# Patient Record
Sex: FEMALE | Race: WHITE | Employment: OTHER | URBAN - METROPOLITAN AREA
[De-identification: names, ages, dates, MRNs, and addresses within clinical notes are randomized per-mention and may not be internally consistent; named-entity substitution may affect disease eponyms.]

---

## 2017-01-17 RX ORDER — DONEPEZIL HYDROCHLORIDE 5 MG/1
TABLET, FILM COATED ORAL
Qty: 90 TAB | Refills: 0 | Status: SHIPPED | OUTPATIENT
Start: 2017-01-17 | End: 2018-01-03 | Stop reason: SDUPTHER

## 2017-04-06 DIAGNOSIS — E11.9 CONTROLLED TYPE 2 DIABETES MELLITUS WITHOUT COMPLICATION, WITHOUT LONG-TERM CURRENT USE OF INSULIN (HCC): Chronic | ICD-10-CM

## 2017-04-06 RX ORDER — SITAGLIPTIN 100 MG/1
TABLET, FILM COATED ORAL
Qty: 45 TAB | Refills: 0 | Status: SHIPPED | OUTPATIENT
Start: 2017-04-06 | End: 2017-06-26 | Stop reason: SDUPTHER

## 2017-04-19 RX ORDER — DONEPEZIL HYDROCHLORIDE 5 MG/1
TABLET, FILM COATED ORAL
Qty: 90 TAB | Refills: 0 | Status: SHIPPED | OUTPATIENT
Start: 2017-04-19 | End: 2018-01-01 | Stop reason: SDDI

## 2017-06-26 DIAGNOSIS — E11.9 CONTROLLED TYPE 2 DIABETES MELLITUS WITHOUT COMPLICATION, WITHOUT LONG-TERM CURRENT USE OF INSULIN (HCC): Chronic | ICD-10-CM

## 2017-06-26 RX ORDER — SITAGLIPTIN 100 MG/1
TABLET, FILM COATED ORAL
Qty: 45 TAB | Refills: 0 | Status: SHIPPED | OUTPATIENT
Start: 2017-06-26 | End: 2018-01-09 | Stop reason: SDUPTHER

## 2017-12-27 ENCOUNTER — OFFICE VISIT (OUTPATIENT)
Dept: INTERNAL MEDICINE CLINIC | Age: 82
End: 2017-12-27

## 2017-12-27 ENCOUNTER — HOSPITAL ENCOUNTER (OUTPATIENT)
Dept: LAB | Age: 82
Discharge: HOME | End: 2017-12-27
Payer: MEDICARE

## 2017-12-27 VITALS
OXYGEN SATURATION: 97 % | HEART RATE: 64 BPM | SYSTOLIC BLOOD PRESSURE: 113 MMHG | RESPIRATION RATE: 16 BRPM | TEMPERATURE: 98.6 F | DIASTOLIC BLOOD PRESSURE: 56 MMHG

## 2017-12-27 DIAGNOSIS — I47.1 ATRIAL TACHYCARDIA (HCC): ICD-10-CM

## 2017-12-27 DIAGNOSIS — M21.372 FOOT DROP, BILATERAL: Primary | ICD-10-CM

## 2017-12-27 DIAGNOSIS — E11.9 CONTROLLED TYPE 2 DIABETES MELLITUS WITHOUT COMPLICATION, WITHOUT LONG-TERM CURRENT USE OF INSULIN (HCC): Chronic | ICD-10-CM

## 2017-12-27 DIAGNOSIS — E78.2 MIXED HYPERLIPIDEMIA: ICD-10-CM

## 2017-12-27 DIAGNOSIS — I10 ESSENTIAL HYPERTENSION: ICD-10-CM

## 2017-12-27 DIAGNOSIS — M21.371 FOOT DROP, BILATERAL: Primary | ICD-10-CM

## 2017-12-27 PROCEDURE — 80053 COMPREHEN METABOLIC PANEL: CPT

## 2017-12-27 PROCEDURE — 84484 ASSAY OF TROPONIN QUANT: CPT

## 2017-12-27 PROCEDURE — 87086 URINE CULTURE/COLONY COUNT: CPT

## 2017-12-27 PROCEDURE — 87077 CULTURE AEROBIC IDENTIFY: CPT

## 2017-12-27 PROCEDURE — 82607 VITAMIN B-12: CPT

## 2017-12-27 PROCEDURE — 85025 COMPLETE CBC W/AUTO DIFF WBC: CPT

## 2017-12-27 PROCEDURE — 83036 HEMOGLOBIN GLYCOSYLATED A1C: CPT

## 2017-12-27 PROCEDURE — 36415 COLL VENOUS BLD VENIPUNCTURE: CPT

## 2017-12-27 PROCEDURE — 81001 URINALYSIS AUTO W/SCOPE: CPT

## 2017-12-27 PROCEDURE — 84443 ASSAY THYROID STIM HORMONE: CPT

## 2017-12-27 PROCEDURE — 87186 SC STD MICRODIL/AGAR DIL: CPT

## 2017-12-27 NOTE — PROGRESS NOTES
HPI:  Matthew Becker is a 80y.o. year old female who is here for difficulty walking for more than a week. She has had a long-standing history of left foot drop but has not been wearing her brace as she did not think it was helpful. She has been compensating for that for years. Over the last week to 2 she has had increasing difficulty with placing her right foot. She denies any falls. No injuries. She is using a Rollator to help with walking and has been for years. She has been taking her blood sugar medication off and on. She denies any pain. Denies headaches or dizziness. No nosebleeds. No chest pains or shortness of breath. No cough or wheeze. No change in bowel or bladder habits. Denies any pain in her legs or lower back. Past Medical History:   Diagnosis Date    Aortic stenosis     Atrial tachycardia (HCC)     Diabetes ([facility]     Ejection fraction < 50% 5/9/2016    Foot drop, left     Glaucoma     Hypercholesterolemia     Hypertension        Past Surgical History:   Procedure Laterality Date    HX APPENDECTOMY  1943    HX BREAST BIOPSY      lumpectomy of left breast-benign 1980's    HX CATARACT REMOVAL      Bilateral    HX GYN      ruptured ovary 1943    HX ORTHOPAEDIC  '96    KNEE; broke left knee    HX ORTHOPAEDIC      BACK; disc removal       Prior to Admission medications    Medication Sig Start Date End Date Taking? Authorizing Provider   JANUVIA 100 mg tablet TAKE 1/2 TABLET BY MOUTH DAILY 6/26/17   Lakeshia Vidal III, MD   donepezil (ARICEPT) 5 mg tablet TAKE ONE TABLET BY MOUTH NIGHTLY 4/19/17   Lakeshia Vidal III, MD   donepezil (ARICEPT) 5 mg tablet TAKE ONE TABLET BY MOUTH NIGHTLY 1/17/17   Lakeshia Vidal III, MD   LOPERAMIDE HCL (IMODIUM PO) Take  by mouth. Historical Provider   donepezil (ARICEPT) 5 mg tablet Take 1 Tab by mouth nightly.  11/10/16   Lakeshia Vidal III, MD   gentamicin (GARAMYCIN) 0.3 % ophthalmic solution Administer 2 Drops to left eye four (4) times daily. 11/10/16   Frank Templeton III, MD   ALPRAZolam Geoffry Chill) 0.25 mg tablet TAKE ONE-HALF TABLET BY MOUTH 2 TIMES A DAY AS NEEDED 8/17/16   Frank Templeton III, MD   propranolol (INDERAL) 40 mg tablet TAKE ONE TABLET BY MOUTH 2 TIMES A DAY 6/10/16   Shary Soulier, NP   PARoxetine (PAXIL) 10 mg tablet TAKE ONE TABLET BY MOUTH EVERY DAY 3/10/16   Peter Pierre MD   Vit A,C,E-Zinc-Copper (PRESERVISION) cap capsule Take 1 Cap by mouth. Historical Provider   acetaminophen (TYLENOL EXTRA STRENGTH) 500 mg tablet Take 2 Tabs by mouth every six (6) hours as needed for Pain. 1/21/16   Alla Saxena MD   Wheat Dextrin (BENEFIBER) 1 gram chew Take 1 Tab by mouth daily. 11/5/15   Frank Templeton III, MD   LACTOBACILLUS ACIDOPHILUS (PROBIOTIC PO) Take  by mouth daily. Historical Provider   aspirin delayed-release 81 mg tablet Take 81 mg by mouth daily. Historical Provider   brinzolamide (AZOPT) 1 % ophthalmic suspension Administer 1 Drop to left eye two (2) times a day. Historical Provider   brimonidine (ALPHAGAN P) 0.1 % ophthalmic solution Administer 1 Drop to left eye three (3) times daily. Historical Provider   latanoprost (XALATAN) 0.005 % ophthalmic solution Administer 1 Drop to left eye nightly. Historical Provider   cholecalciferol (VITAMIN D3) 1,000 unit tablet Take 1,000 Units by mouth daily. Historical Provider       Social History     Social History    Marital status:      Spouse name: N/A    Number of children: N/A    Years of education: N/A     Occupational History    Not on file.      Social History Main Topics    Smoking status: Never Smoker    Smokeless tobacco: Never Used    Alcohol use No    Drug use: No    Sexual activity: Not Currently     Other Topics Concern    Not on file     Social History Narrative          ROS  Per HPI    Visit Vitals    /56    Pulse 64    Temp 98.6 °F (37 °C) (Oral)    Resp 16    SpO2 97%         Physical Exam Physical Examination: General appearance - alert, well appearing, and in no distress  Mouth - mucous membranes moist, pharynx normal without lesions  Neck - supple, no significant adenopathy  Lymphatics - no palpable lymphadenopathy, no hepatosplenomegaly  Chest - clear to auscultation, no wheezes, rales or rhonchi, symmetric air entry  Heart - normal rate and regular rhythm  Abdomen - soft, nontender, nondistended, no masses or organomegaly  Musculoskeletal - no joint tenderness, deformity or swelling  Extremities - peripheral pulses normal, no pedal edema, no clubbing or cyanosis  She has an obvious footdrop on the left with a brace in place. She has difficulty with plantar flexion. She has difficulty with plantar flexion on the right foot as well. Otherwise she has excellent strength in her lower extremities and upper extremities. Reflexes are reduced throughout. Assessment/Plan:  Diagnoses and all orders for this visit:    1. Foot drop, bilateral -likely diabetic neuropathy related. Will obtain a nerve conduction velocity to see if this is something that can be addressed or fixed. In the meantime we will order home physical therapy for her. This visit will service her face-to-face for home physical therapy. She is is currently homebound and lives in an assisted living and has done so for quite some time now. She requires assistance for her activities of daily living due to generalized weakness as well as profound vision and hearing deficits. -     TSH RFX ON ABNORMAL TO FREE T4  -     VITAMIN B12 & FOLATE  -     NCV SENSORY OR MIXED; Future    2. Controlled type 2 diabetes mellitus without complication, without long-term current use of insulin ([address] -? Controlled. She is noncompliant with her medication and her caregivers are checking her sugars intermittently. Will check labs today and address as needed.   -     METABOLIC PANEL, COMPREHENSIVE  -     HEMOGLOBIN A1C WITH EAG  -     CBC WITH AUTOMATED DIFF  -     UA/M W/RFLX CULTURE, ROUTINE    3. Atrial tachycardia ([address]    4. Essential hypertension -blood pressure well controlled. Will continue current medicines for now. 5. Mixed hyperlipidemia       Follow-up Disposition:        Advised her to call back or return to office if symptoms worsen/change/persist.  Discussed expected course/resolution/complications of diagnosis in detail with patient. Medication risks/benefits/costs/interactions/alternatives discussed with patient. She was given an after visit summary which includes diagnoses, current medications, & vitals. She expressed understanding with the diagnosis and plan.

## 2017-12-27 NOTE — PROGRESS NOTES
Chief Complaint   Patient presents with    Difficulty Walking     1. Have you been to the ER, urgent care clinic since your last visit? Hospitalized since your last visit? No    2. Have you seen or consulted any other health care providers outside of the [address] [place] [address] since your last visit? Include any pap smears or colon screening.  No

## 2017-12-27 NOTE — MR AVS SNAPSHOT
Visit Information Date & Time Provider Department Dept. Phone Encounter #  
 12/27/2017 11:00 AM Haris Anthony MD [address] [id number] Internal Medicine [phone] [id number] Upcoming Health Maintenance Date Due  
 EYE EXAM RETINAL OR DILATED Q1 2/23/2016 FOOT EXAM Q1 11/5/2016 GLAUCOMA SCREENING Q2Y 2/23/2017 HEMOGLOBIN A1C Q6M 5/28/2017 Influenza Age 5 to Adult 8/1/2017 MEDICARE YEARLY EXAM 11/11/2017 MICROALBUMIN Q1 11/28/2017 LIPID PANEL Q1 11/28/2017 DTaP/Tdap/Td series (2 - Td) 11/10/2026 Allergies as of 12/27/2017  Review Complete On: 5/9/2016 By: Baron Macdonald LPN Severity Noted Reaction Type Reactions Timolol Medium 11/10/2016    Hives Current Immunizations  Reviewed on 4/6/2012 Name Date Pneumococcal Conjugate (PCV-13) 11/11/2015 ZZZ-RETIRED (DO NOT USE) Pneumococcal Vaccine (Unspecified Type) 1/1/2005 Not reviewed this visit You Were Diagnosed With   
  
 Codes Comments Foot drop, bilateral    -  Primary ICD-10-CM: M21.371, M21.372 ICD-9-CM: 736.79 Controlled type 2 diabetes mellitus without complication, without long-term current use of insulin ([facility]     ICD-10-CM: E11.9 ICD-9-CM: 250.00 Atrial tachycardia (HCC)     ICD-10-CM: I47.1 ICD-9-CM: 427.89 Essential hypertension     ICD-10-CM: I10 
ICD-9-CM: 401.9 Mixed hyperlipidemia     ICD-10-CM: E78.2 ICD-9-CM: 272.2 Vitals BP Pulse Temp Resp SpO2 OB Status 113/56 64 98.6 °F (37 °C) (Oral) 16 97% Postmenopausal  
 Smoking Status Never Smoker Preferred Pharmacy Pharmacy Name Phone [facility] [phone] Your Updated Medication List  
  
   
This list is accurate as of: 12/27/17 11:55 AM.  Always use your most recent med list.  
  
  
  
  
 acetaminophen 500 mg tablet Commonly known as:  [address]  
 Take 2 Tabs by mouth every six (6) hours as needed for Pain. ALPHAGAN P 0.1 % ophthalmic solution Generic drug:  brimonidine Administer 1 Drop to left eye three (3) times daily. ALPRAZolam 0.25 mg tablet Commonly known as:  XANAX  
TAKE ONE-HALF TABLET BY MOUTH 2 TIMES A DAY AS NEEDED  
  
 aspirin delayed-release 81 mg tablet Take 81 mg by mouth daily. AZOPT 1 % ophthalmic suspension Generic drug:  brinzolamide Administer 1 Drop to left eye two (2) times a day. Benefiber 1 gram Chew Generic drug:  Wheat Dextrin Take 1 Tab by mouth daily. * donepezil 5 mg tablet Commonly known as:  ARICEPT Take 1 Tab by mouth nightly. * donepezil 5 mg tablet Commonly known as:  ARICEPT  
TAKE ONE TABLET BY MOUTH NIGHTLY * donepezil 5 mg tablet Commonly known as:  ARICEPT  
TAKE ONE TABLET BY MOUTH NIGHTLY  
  
 gentamicin 0.3 % ophthalmic solution Commonly known as:  GARAMYCIN Administer 2 Drops to left eye four (4) times daily. IMODIUM PO Take  by mouth. JANUVIA 100 mg tablet Generic drug:  SITagliptin TAKE 1/2 TABLET BY MOUTH DAILY PARoxetine 10 mg tablet Commonly known as:  PAXIL TAKE ONE TABLET BY MOUTH EVERY DAY  
  
 PROBIOTIC PO Take  by mouth daily. propranolol 40 mg tablet Commonly known as:  INDERAL  
TAKE ONE TABLET BY MOUTH 2 TIMES A DAY Vit A,C,E-Zinc-Copper Cap capsule Commonly known as:  PRESERVISION Take 1 Cap by mouth. VITAMIN D3 1,000 unit tablet Generic drug:  cholecalciferol Take 1,000 Units by mouth daily. XALATAN 0.005 % ophthalmic solution Generic drug:  latanoprost  
Administer 1 Drop to left eye nightly. * Notice: This list has 3 medication(s) that are the same as other medications prescribed for you. Read the directions carefully, and ask your doctor or other care provider to review them with you. We Performed the Following CBC WITH AUTOMATED DIFF [81486 CPT(R)] HEMOGLOBIN A1C WITH EAG [17176 CPT(R)] METABOLIC PANEL, COMPREHENSIVE [24164 CPT(R)] TSH RFX ON ABNORMAL TO FREE T4 [[id number] Custom] UA/M W/RFLX CULTURE, ROUTINE [[id number] Custom] VITAMIN B12 & FOLATE [19403 CPT(R)] To-Do List   
 12/27/2017 Neurology:  NCV SENSORY OR MIXED Introducing [facility]! Dear Dave Sanchez: Thank you for requesting a [employer] account. Our records indicate that you already have an active [employer] account. You can access your account anytime at [email or web address] Did you know that you can access your hospital and ER discharge instructions at any time in [employer]? You can also review all of your test results from your hospital stay or ER visit. Additional Information If you have questions, please visit the Frequently Asked Questions section of the [employer] website at [email or web address] Remember, [employer] is NOT to be used for urgent needs. For medical emergencies, dial 911. Now available from your iPhone and Android! Please provide this summary of care documentation to your next provider. Your primary care clinician is listed as Geoff [phone] If you have any questions after today's visit, please call [phone].

## 2017-12-30 LAB
ALBUMIN SERPL-MCNC: 3.4 G/DL (ref 3.2–4.6)
ALBUMIN/GLOB SERPL: 1.1 {RATIO} (ref 1.2–2.2)
ALP SERPL-CCNC: 89 IU/L (ref 39–117)
ALT SERPL-CCNC: 10 IU/L (ref 0–32)
APPEARANCE UR: ABNORMAL
AST SERPL-CCNC: 20 IU/L (ref 0–40)
BACTERIA #/AREA URNS HPF: ABNORMAL /[HPF]
BACTERIA UR CULT: ABNORMAL
BASOPHILS # BLD AUTO: 0.1 X10E3/UL (ref 0–0.2)
BASOPHILS NFR BLD AUTO: 1 %
BILIRUB SERPL-MCNC: 0.5 MG/DL (ref 0–1.2)
BILIRUB UR QL STRIP: NEGATIVE
BUN SERPL-MCNC: 18 MG/DL (ref 10–36)
BUN/CREAT SERPL: 21 (ref 12–28)
CALCIUM SERPL-MCNC: 10.2 MG/DL (ref 8.7–10.3)
CASTS URNS QL MICRO: ABNORMAL /LPF
CHLORIDE SERPL-SCNC: 105 MMOL/L (ref 96–106)
CO2 SERPL-SCNC: 24 MMOL/L (ref 18–29)
COLOR UR: YELLOW
CREAT SERPL-MCNC: 0.85 MG/DL (ref 0.57–1)
EOSINOPHIL # BLD AUTO: 0.2 X10E3/UL (ref 0–0.4)
EOSINOPHIL NFR BLD AUTO: 2 %
EPI CELLS #/AREA URNS HPF: >10 /HPF
ERYTHROCYTE [DISTWIDTH] IN BLOOD BY AUTOMATED COUNT: 12.9 % (ref 12.3–15.4)
EST. AVERAGE GLUCOSE BLD GHB EST-MCNC: 160 MG/DL
FOLATE SERPL-MCNC: >20 NG/ML
GLOBULIN SER CALC-MCNC: 3.1 G/DL (ref 1.5–4.5)
GLUCOSE SERPL-MCNC: 116 MG/DL (ref 65–99)
GLUCOSE UR QL: NEGATIVE
HBA1C MFR BLD: 7.2 % (ref 4.8–5.6)
HCT VFR BLD AUTO: 35.9 % (ref 34–46.6)
HGB BLD-MCNC: 11.8 G/DL (ref 11.1–15.9)
HGB UR QL STRIP: ABNORMAL
IMM GRANULOCYTES # BLD: 0 X10E3/UL (ref 0–0.1)
IMM GRANULOCYTES NFR BLD: 0 %
KETONES UR QL STRIP: NEGATIVE
LEUKOCYTE ESTERASE UR QL STRIP: ABNORMAL
LYMPHOCYTES # BLD AUTO: 3.8 X10E3/UL (ref 0.7–3.1)
LYMPHOCYTES NFR BLD AUTO: 38 %
MCH RBC QN AUTO: 28.9 PG (ref 26.6–33)
MCHC RBC AUTO-ENTMCNC: 32.9 G/DL (ref 31.5–35.7)
MCV RBC AUTO: 88 FL (ref 79–97)
MICRO URNS: ABNORMAL
MONOCYTES # BLD AUTO: 0.7 X10E3/UL (ref 0.1–0.9)
MONOCYTES NFR BLD AUTO: 7 %
MUCOUS THREADS URNS QL MICRO: PRESENT
NEUTROPHILS # BLD AUTO: 5.2 X10E3/UL (ref 1.4–7)
NEUTROPHILS NFR BLD AUTO: 52 %
NITRITE UR QL STRIP: POSITIVE
PH UR STRIP: 5 [PH] (ref 5–7.5)
PLATELET # BLD AUTO: 211 X10E3/UL (ref 150–379)
POTASSIUM SERPL-SCNC: 4.5 MMOL/L (ref 3.5–5.2)
PROT SERPL-MCNC: 6.5 G/DL (ref 6–8.5)
PROT UR QL STRIP: ABNORMAL
RBC # BLD AUTO: 4.09 X10E6/UL (ref 3.77–5.28)
RBC #/AREA URNS HPF: ABNORMAL /HPF
SODIUM SERPL-SCNC: 141 MMOL/L (ref 134–144)
SP GR UR: 1.02 (ref 1–1.03)
TSH SERPL DL<=0.005 MIU/L-ACNC: 2.45 UIU/ML (ref 0.45–4.5)
URINALYSIS REFLEX, 377202: ABNORMAL
UROBILINOGEN UR STRIP-MCNC: 0.2 MG/DL (ref 0.2–1)
VIT B12 SERPL-MCNC: 537 PG/ML (ref 232–1245)
WBC # BLD AUTO: 9.9 X10E3/UL (ref 3.4–10.8)
WBC #/AREA URNS HPF: >30 /HPF

## 2018-01-01 ENCOUNTER — TELEPHONE (OUTPATIENT)
Dept: INTERNAL MEDICINE CLINIC | Age: 83
End: 2018-01-01

## 2018-01-01 ENCOUNTER — CLINICAL SUPPORT (OUTPATIENT)
Dept: INTERNAL MEDICINE CLINIC | Age: 83
End: 2018-01-01

## 2018-01-01 ENCOUNTER — PATIENT MESSAGE (OUTPATIENT)
Dept: INTERNAL MEDICINE CLINIC | Age: 83
End: 2018-01-01

## 2018-01-01 ENCOUNTER — HOSPITAL ENCOUNTER (OUTPATIENT)
Dept: LAB | Age: 83
Discharge: HOME | End: 2018-06-25
Payer: MEDICARE

## 2018-01-01 ENCOUNTER — OFFICE VISIT (OUTPATIENT)
Dept: INTERNAL MEDICINE CLINIC | Age: 83
End: 2018-01-01

## 2018-01-01 ENCOUNTER — HOSPITAL ENCOUNTER (OUTPATIENT)
Dept: LAB | Age: 83
Discharge: HOME | End: 2018-06-15
Payer: MEDICARE

## 2018-01-01 ENCOUNTER — HOSPITAL ENCOUNTER (EMERGENCY)
Age: 83
Discharge: HOME HEALTH | End: 2018-07-28
Attending: EMERGENCY MEDICINE
Payer: MEDICARE

## 2018-01-01 ENCOUNTER — PATIENT OUTREACH (OUTPATIENT)
Dept: INTERNAL MEDICINE CLINIC | Age: 83
End: 2018-01-01

## 2018-01-01 ENCOUNTER — HOSPITAL ENCOUNTER (OUTPATIENT)
Dept: LAB | Age: 83
Discharge: HOME | End: 2018-11-12
Payer: MEDICARE

## 2018-01-01 ENCOUNTER — APPOINTMENT (OUTPATIENT)
Dept: CT IMAGING | Age: 83
End: 2018-01-01
Attending: EMERGENCY MEDICINE
Payer: MEDICARE

## 2018-01-01 ENCOUNTER — HOSPICE ADMISSION (OUTPATIENT)
Dept: HOSPICE | Facility: HOSPICE | Age: 83
End: 2018-01-01

## 2018-01-01 ENCOUNTER — HOSPITAL ENCOUNTER (OUTPATIENT)
Dept: LAB | Age: 83
Discharge: HOME | End: 2018-06-26
Payer: MEDICARE

## 2018-01-01 ENCOUNTER — APPOINTMENT (OUTPATIENT)
Dept: GENERAL RADIOLOGY | Age: 83
End: 2018-01-01
Attending: EMERGENCY MEDICINE
Payer: MEDICARE

## 2018-01-01 VITALS
SYSTOLIC BLOOD PRESSURE: 112 MMHG | HEART RATE: 75 BPM | BODY MASS INDEX: 22.82 KG/M2 | WEIGHT: 142 LBS | TEMPERATURE: 97.5 F | OXYGEN SATURATION: 100 % | RESPIRATION RATE: 16 BRPM | HEIGHT: 66 IN | DIASTOLIC BLOOD PRESSURE: 82 MMHG

## 2018-01-01 VITALS
OXYGEN SATURATION: 98 % | DIASTOLIC BLOOD PRESSURE: 71 MMHG | HEART RATE: 58 BPM | TEMPERATURE: 98.7 F | SYSTOLIC BLOOD PRESSURE: 125 MMHG

## 2018-01-01 VITALS
SYSTOLIC BLOOD PRESSURE: 114 MMHG | TEMPERATURE: 97.9 F | RESPIRATION RATE: 10 BRPM | HEART RATE: 58 BPM | HEIGHT: 66 IN | DIASTOLIC BLOOD PRESSURE: 74 MMHG | OXYGEN SATURATION: 98 %

## 2018-01-01 DIAGNOSIS — R41.82 ALTERED MENTAL STATUS, UNSPECIFIED ALTERED MENTAL STATUS TYPE: ICD-10-CM

## 2018-01-01 DIAGNOSIS — E11.9 CONTROLLED TYPE 2 DIABETES MELLITUS WITHOUT COMPLICATION, WITHOUT LONG-TERM CURRENT USE OF INSULIN (HCC): Chronic | ICD-10-CM

## 2018-01-01 DIAGNOSIS — N39.0 FREQUENT UTI: ICD-10-CM

## 2018-01-01 DIAGNOSIS — R41.0 DELIRIUM: ICD-10-CM

## 2018-01-01 DIAGNOSIS — R39.9 UTI SYMPTOMS: Primary | ICD-10-CM
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
## 2018-01-01 DIAGNOSIS — Z87.440 HX: UTI (URINARY TRACT INFECTION): Primary | ICD-10-CM

## 2018-01-01 DIAGNOSIS — N39.0 FREQUENT UTI: Primary | ICD-10-CM

## 2018-01-01 DIAGNOSIS — S09.90XA MINOR HEAD INJURY, INITIAL ENCOUNTER: ICD-10-CM

## 2018-01-01 DIAGNOSIS — S60.212A CONTUSION OF LEFT WRIST, INITIAL ENCOUNTER: ICD-10-CM

## 2018-01-01 DIAGNOSIS — I10 ESSENTIAL HYPERTENSION: ICD-10-CM

## 2018-01-01 DIAGNOSIS — S61.512A TEAR OF SKIN OF LEFT WRIST, INITIAL ENCOUNTER: ICD-10-CM

## 2018-01-01 DIAGNOSIS — N17.9 AKI (ACUTE KIDNEY INJURY) (HCC): ICD-10-CM

## 2018-01-01 DIAGNOSIS — E11.9 CONTROLLED TYPE 2 DIABETES MELLITUS WITHOUT COMPLICATION, WITHOUT LONG-TERM CURRENT USE OF INSULIN (HCC): Primary | Chronic | ICD-10-CM

## 2018-01-01 LAB
ALBUMIN SERPL-MCNC: 3.7 G/DL (ref 3.2–4.6)
ALBUMIN/GLOB SERPL: 1.3 {RATIO} (ref 1.2–2.2)
ALP SERPL-CCNC: 90 IU/L (ref 39–117)
ALT SERPL-CCNC: 9 IU/L (ref 0–32)
APPEARANCE UR: ABNORMAL
APPEARANCE UR: CLEAR
APPEARANCE UR: CLEAR
AST SERPL-CCNC: 20 IU/L (ref 0–40)
BACTERIA #/AREA URNS HPF: ABNORMAL /[HPF]
BACTERIA #/AREA URNS HPF: ABNORMAL /[HPF]
BACTERIA #/AREA URNS HPF: NORMAL /[HPF]
BACTERIA UR CULT: ABNORMAL
BACTERIA UR CULT: ABNORMAL
BACTERIA UR CULT: NORMAL
BASOPHILS # BLD AUTO: 0.1 X10E3/UL (ref 0–0.2)
BASOPHILS NFR BLD AUTO: 1 %
BILIRUB SERPL-MCNC: 0.6 MG/DL (ref 0–1.2)
BILIRUB UR QL STRIP: NEGATIVE
BUN SERPL-MCNC: 23 MG/DL (ref 10–36)
BUN/CREAT SERPL: 24 (ref 12–28)
CALCIUM SERPL-MCNC: 10.6 MG/DL (ref 8.7–10.3)
CASTS URNS QL MICRO: ABNORMAL /LPF
CASTS URNS QL MICRO: ABNORMAL /LPF
CASTS URNS QL MICRO: NORMAL /LPF
CHLORIDE SERPL-SCNC: 101 MMOL/L (ref 96–106)
CO2 SERPL-SCNC: 22 MMOL/L (ref 20–29)
COLOR UR: YELLOW
CREAT SERPL-MCNC: 0.97 MG/DL (ref 0.57–1)
EOSINOPHIL # BLD AUTO: 0.2 X10E3/UL (ref 0–0.4)
EOSINOPHIL NFR BLD AUTO: 2 %
EPI CELLS #/AREA URNS HPF: ABNORMAL /HPF
EPI CELLS #/AREA URNS HPF: ABNORMAL /HPF
EPI CELLS #/AREA URNS HPF: NORMAL /HPF
ERYTHROCYTE [DISTWIDTH] IN BLOOD BY AUTOMATED COUNT: 13.5 % (ref 12.3–15.4)
GLOBULIN SER CALC-MCNC: 2.8 G/DL (ref 1.5–4.5)
GLUCOSE SERPL-MCNC: 141 MG/DL (ref 65–99)
GLUCOSE UR QL: NEGATIVE
GLUCOSE UR-MCNC: NEGATIVE MG/DL
GLUCOSE UR-MCNC: NEGATIVE MG/DL
HCT VFR BLD AUTO: 37.3 % (ref 34–46.6)
HGB BLD-MCNC: 12.9 G/DL (ref 11.1–15.9)
HGB UR QL STRIP: ABNORMAL
HGB UR QL STRIP: NEGATIVE
HGB UR QL STRIP: NEGATIVE
KETONES P FAST UR STRIP-MCNC: NEGATIVE MG/DL
KETONES P FAST UR STRIP-MCNC: NEGATIVE MG/DL
KETONES UR QL STRIP: NEGATIVE
LEUKOCYTE ESTERASE UR QL STRIP: ABNORMAL
LYMPHOCYTES # BLD AUTO: 3 X10E3/UL (ref 0.7–3.1)
LYMPHOCYTES NFR BLD AUTO: 31 %
MCH RBC QN AUTO: 29.7 PG (ref 26.6–33)
MCHC RBC AUTO-ENTMCNC: 34.6 G/DL (ref 31.5–35.7)
MCV RBC AUTO: 86 FL (ref 79–97)
MICRO URNS: ABNORMAL
MONOCYTES # BLD AUTO: 0.8 X10E3/UL (ref 0.1–0.9)
MONOCYTES NFR BLD AUTO: 8 %
MUCOUS THREADS URNS QL MICRO: PRESENT
MUCOUS THREADS URNS QL MICRO: PRESENT
NEUTROPHILS # BLD AUTO: 5.7 X10E3/UL (ref 1.4–7)
NEUTROPHILS NFR BLD AUTO: 58 %
NITRITE UR QL STRIP: NEGATIVE
NITRITE UR QL STRIP: NEGATIVE
NITRITE UR QL STRIP: POSITIVE
PH UR STRIP: 5.5 [PH] (ref 4.6–8)
PH UR STRIP: 5.5 [PH] (ref 5–7.5)
PH UR STRIP: 6 [PH] (ref 5–7.5)
PH UR STRIP: 7 [PH] (ref 4.6–8)
PH UR STRIP: 7 [PH] (ref 5–7.5)
PLATELET # BLD AUTO: 223 X10E3/UL (ref 150–379)
POTASSIUM SERPL-SCNC: 4.3 MMOL/L (ref 3.5–5.2)
PROT SERPL-MCNC: 6.5 G/DL (ref 6–8.5)
PROT UR QL STRIP: ABNORMAL
PROT UR QL STRIP: NEGATIVE
RBC # BLD AUTO: 4.35 X10E6/UL (ref 3.77–5.28)
RBC #/AREA URNS HPF: ABNORMAL /HPF
RBC #/AREA URNS HPF: ABNORMAL /HPF
RBC #/AREA URNS HPF: NORMAL /HPF
SODIUM SERPL-SCNC: 138 MMOL/L (ref 134–144)
SP GR UR STRIP: 1.01 (ref 1–1.03)
SP GR UR STRIP: NORMAL (ref 1–1.03)
SP GR UR: 1.01 (ref 1–1.03)
UA UROBILINOGEN AMB POC: NORMAL (ref 0.2–1)
UA UROBILINOGEN AMB POC: NORMAL (ref 0.2–1)
URINALYSIS CLARITY POC: CLEAR
URINALYSIS CLARITY POC: CLEAR
URINALYSIS COLOR POC: YELLOW
URINALYSIS COLOR POC: YELLOW
URINALYSIS REFLEX, 377202: ABNORMAL
URINE BLOOD POC: NEGATIVE
URINE BLOOD POC: NORMAL
URINE LEUKOCYTES POC: NEGATIVE
URINE LEUKOCYTES POC: NORMAL
URINE NITRITES POC: NEGATIVE
URINE NITRITES POC: NEGATIVE
UROBILINOGEN UR STRIP-MCNC: 0.2 MG/DL (ref 0.2–1)
WBC # BLD AUTO: 9.7 X10E3/UL (ref 3.4–10.8)
WBC #/AREA URNS HPF: >30 /HPF
WBC #/AREA URNS HPF: ABNORMAL /HPF
WBC #/AREA URNS HPF: NORMAL /HPF

## 2018-01-01 PROCEDURE — 87086 URINE CULTURE/COLONY COUNT: CPT

## 2018-01-01 PROCEDURE — 81001 URINALYSIS AUTO W/SCOPE: CPT

## 2018-01-01 PROCEDURE — 85025 COMPLETE CBC W/AUTO DIFF WBC: CPT

## 2018-01-01 PROCEDURE — 36415 COLL VENOUS BLD VENIPUNCTURE: CPT

## 2018-01-01 PROCEDURE — 87186 SC STD MICRODIL/AGAR DIL: CPT

## 2018-01-01 PROCEDURE — 99285 EMERGENCY DEPT VISIT HI MDM: CPT

## 2018-01-01 PROCEDURE — 73610 X-RAY EXAM OF ANKLE: CPT

## 2018-01-01 PROCEDURE — 87088 URINE BACTERIA CULTURE: CPT

## 2018-01-01 PROCEDURE — 87077 CULTURE AEROBIC IDENTIFY: CPT

## 2018-01-01 PROCEDURE — 80053 COMPREHEN METABOLIC PANEL: CPT

## 2018-01-01 PROCEDURE — 70450 CT HEAD/BRAIN W/O DYE: CPT

## 2018-01-01 PROCEDURE — 73110 X-RAY EXAM OF WRIST: CPT

## 2018-01-01 RX ORDER — SULFAMETHOXAZOLE AND TRIMETHOPRIM 800; 160 MG/1; MG/1
1 TABLET ORAL 2 TIMES DAILY
Qty: 14 TAB | Refills: 0 | Status: SHIPPED | OUTPATIENT
Start: 2018-01-01 | End: 2018-01-01

## 2018-01-01 RX ORDER — NITROFURANTOIN 25; 75 MG/1; MG/1
100 CAPSULE ORAL 2 TIMES DAILY
Qty: 14 CAP | Refills: 0 | Status: SHIPPED | OUTPATIENT
Start: 2018-01-01 | End: 2018-01-01 | Stop reason: SDDI

## 2018-01-01 RX ORDER — SULFAMETHOXAZOLE AND TRIMETHOPRIM 800; 160 MG/1; MG/1
1 TABLET ORAL 2 TIMES DAILY
COMMUNITY
End: 2018-01-01 | Stop reason: SDDI

## 2018-01-01 RX ORDER — SULFAMETHOXAZOLE AND TRIMETHOPRIM 800; 160 MG/1; MG/1
1 TABLET ORAL 2 TIMES DAILY
Qty: 14 TAB | Refills: 0 | Status: SHIPPED | OUTPATIENT
Start: 2018-01-01 | End: 2018-01-01 | Stop reason: SDUPTHER

## 2018-01-01 RX ORDER — CIPROFLOXACIN 250 MG/1
250 TABLET, FILM COATED ORAL 2 TIMES DAILY
Qty: 14 TAB | Refills: 0 | Status: SHIPPED | OUTPATIENT
Start: 2018-01-01 | End: 2018-01-01 | Stop reason: SDUPTHER

## 2018-01-02 ENCOUNTER — TELEPHONE (OUTPATIENT)
Dept: INTERNAL MEDICINE CLINIC | Age: 83
End: 2018-01-02

## 2018-01-03 ENCOUNTER — TELEPHONE (OUTPATIENT)
Dept: INTERNAL MEDICINE CLINIC | Age: 83
End: 2018-01-03

## 2018-01-03 DIAGNOSIS — M21.372 FOOT DROP, BILATERAL: Primary | ICD-10-CM

## 2018-01-03 DIAGNOSIS — M21.371 FOOT DROP, BILATERAL: Primary | ICD-10-CM

## 2018-01-03 RX ORDER — SULFAMETHOXAZOLE AND TRIMETHOPRIM 800; 160 MG/1; MG/1
1 TABLET ORAL 2 TIMES DAILY
Qty: 14 TAB | Refills: 0 | Status: SHIPPED | OUTPATIENT
Start: 2018-01-03 | End: 2018-01-10

## 2018-01-03 NOTE — TELEPHONE ENCOUNTER
Spoke with pt daughter [facility] in ref recent lab results. is on HIPPA, 2 pt identifiers. Pt ucx grew >100,000 colony klebsiella pneumoniae. Per SRJ, will start Bactrim DS BID x 7 days. All other labs fine. Force fluids. Rx sent to pharm. Orders Placed This Encounter    trimethoprim-sulfamethoxazole (BACTRIM DS, SEPTRA DS) 160-800 mg per tablet     Sig: Take 1 Tab by mouth two (2) times a day for 7 days.      Dispense:  14 Tab     Refill:  0

## 2018-01-03 NOTE — TELEPHONE ENCOUNTER
Spoke with daughter Sujatha Selby again who is on hippa. 2 pt identifiers. She wanted to know the results of the A1c.

## 2018-01-03 NOTE — TELEPHONE ENCOUNTER
----- Message from Darold Gaucher III, MD sent at 1/1/2018  3:30 PM EST -----  Bactrim DS BID for 7 days. Other labs are OK.

## 2018-01-03 NOTE — PROGRESS NOTES
Spoke with pt daughter Kilo Penn who is on Hippa. 2 pt identifiers. Decided that pt will not need home PT. She will take her to the PT team where pt lives at [facility]. Order mailed to pt home.

## 2018-01-03 NOTE — TELEPHONE ENCOUNTER
Pt's daughter was told about the abnormal lab but, did not get the information about the rest the labs.   Please advise [phone]

## 2018-01-09 DIAGNOSIS — E11.9 CONTROLLED TYPE 2 DIABETES MELLITUS WITHOUT COMPLICATION, WITHOUT LONG-TERM CURRENT USE OF INSULIN (HCC): Chronic | ICD-10-CM

## 2018-01-09 RX ORDER — SITAGLIPTIN 100 MG/1
TABLET, FILM COATED ORAL
Qty: 45 TAB | Refills: 0 | Status: SHIPPED | OUTPATIENT
Start: 2018-01-09 | End: 2018-01-01 | Stop reason: SDDI

## 2018-01-15 ENCOUNTER — TELEPHONE (OUTPATIENT)
Dept: INTERNAL MEDICINE CLINIC | Age: 83
End: 2018-01-15

## 2018-01-15 NOTE — TELEPHONE ENCOUNTER
Tata Patel () [phone]     pt's daughter is calling regarding an order she needs for her mother for a foot brace. She says that she is coming to town on wed to take her mother to [facility] that morning and would like to make only one trip.

## 2018-01-24 NOTE — TELEPHONE ENCOUNTER
Spoke with Bartolo Rodríguez. 2 pt identifiers. She states that [facility] where pt is going for [facility] is telling her that pt needs some type of foot brace? ? Explained I will contact [facility] and will call her back if I need anything from her.

## 2018-02-07 DIAGNOSIS — E11.8 TYPE 2 DIABETES MELLITUS WITH COMPLICATION, UNSPECIFIED LONG TERM INSULIN USE STATUS: ICD-10-CM

## 2018-02-07 NOTE — TELEPHONE ENCOUNTER
Orders Placed This Encounter    glucose blood VI test strips (BLOOD GLUCOSE TEST) strip     Sig: For use with Contour Martell glucometer. Pt will check BS twice a day. Dx:  E11.8. Dispense:  100 Strip     Refill:  11     The above medication refills were approved via verbal order by Dr. Elle Barfield III.

## 2018-02-19 ENCOUNTER — HOSPITAL ENCOUNTER (EMERGENCY)
Age: 83
Discharge: NURSING, REHAB OR LONG-TERM CARE | End: 2018-02-19
Attending: STUDENT IN AN ORGANIZED HEALTH CARE EDUCATION/TRAINING PROGRAM
Payer: MEDICARE

## 2018-02-19 ENCOUNTER — APPOINTMENT (OUTPATIENT)
Dept: GENERAL RADIOLOGY | Age: 83
End: 2018-02-19
Attending: STUDENT IN AN ORGANIZED HEALTH CARE EDUCATION/TRAINING PROGRAM
Payer: MEDICARE

## 2018-02-19 VITALS
HEART RATE: 64 BPM | SYSTOLIC BLOOD PRESSURE: 143 MMHG | TEMPERATURE: 98.8 F | RESPIRATION RATE: 22 BRPM | DIASTOLIC BLOOD PRESSURE: 108 MMHG | HEIGHT: 66 IN | OXYGEN SATURATION: 96 %
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
## 2018-02-19 LAB
ALBUMIN SERPL-MCNC: 3.1 G/DL (ref 3.5–5)
ALBUMIN/GLOB SERPL: 0.8 {RATIO} (ref 1.1–2.2)
ALP SERPL-CCNC: 86 U/L (ref 45–117)
ALT SERPL-CCNC: 16 U/L (ref 12–78)
ANION GAP SERPL CALC-SCNC: 6 MMOL/L (ref 5–15)
AST SERPL-CCNC: 24 U/L (ref 15–37)
ATRIAL RATE: 66 BPM
BASOPHILS # BLD: 0 K/UL (ref 0–0.1)
BASOPHILS NFR BLD: 0 % (ref 0–1)
BILIRUB SERPL-MCNC: 0.5 MG/DL (ref 0.2–1)
BUN SERPL-MCNC: 22 MG/DL (ref 6–20)
BUN/CREAT SERPL: 24 (ref 12–20)
CALCIUM SERPL-MCNC: 9.1 MG/DL (ref 8.5–10.1)
CALCULATED P AXIS, ECG09: 45 DEGREES
CALCULATED R AXIS, ECG10: -53 DEGREES
CALCULATED T AXIS, ECG11: 18 DEGREES
CHLORIDE SERPL-SCNC: 108 MMOL/L (ref 97–108)
CO2 SERPL-SCNC: 26 MMOL/L (ref 21–32)
CREAT SERPL-MCNC: 0.91 MG/DL (ref 0.55–1.02)
DIAGNOSIS, 93000: NORMAL
DIFFERENTIAL METHOD BLD: ABNORMAL
EOSINOPHIL # BLD: 0.1 K/UL (ref 0–0.4)
EOSINOPHIL NFR BLD: 2 % (ref 0–7)
ERYTHROCYTE [DISTWIDTH] IN BLOOD BY AUTOMATED COUNT: 13.2 % (ref 11.5–14.5)
GLOBULIN SER CALC-MCNC: 4.1 G/DL (ref 2–4)
GLUCOSE SERPL-MCNC: 115 MG/DL (ref 65–100)
HCT VFR BLD AUTO: 39.4 % (ref 35–47)
HGB BLD-MCNC: 12.9 G/DL (ref 11.5–16)
IMM GRANULOCYTES # BLD: 0 K/UL (ref 0–0.04)
IMM GRANULOCYTES NFR BLD AUTO: 0 % (ref 0–0.5)
LYMPHOCYTES # BLD: 3 K/UL (ref 0.8–3.5)
LYMPHOCYTES NFR BLD: 57 % (ref 12–49)
MCH RBC QN AUTO: 29.4 PG (ref 26–34)
MCHC RBC AUTO-ENTMCNC: 32.7 G/DL (ref 30–36.5)
MCV RBC AUTO: 89.7 FL (ref 80–99)
MONOCYTES # BLD: 0.5 K/UL (ref 0–1)
MONOCYTES NFR BLD: 10 % (ref 5–13)
NEUTS SEG # BLD: 1.6 K/UL (ref 1.8–8)
NEUTS SEG NFR BLD: 31 % (ref 32–75)
NRBC # BLD: 0 K/UL (ref 0–0.01)
NRBC BLD-RTO: 0 PER 100 WBC
PLATELET # BLD AUTO: 134 K/UL (ref 150–400)
PMV BLD AUTO: 11.3 FL (ref 8.9–12.9)
POTASSIUM SERPL-SCNC: 4 MMOL/L (ref 3.5–5.1)
PROT SERPL-MCNC: 7.2 G/DL (ref 6.4–8.2)
Q-T INTERVAL, ECG07: 458 MS
QRS DURATION, ECG06: 130 MS
QTC CALCULATION (BEZET), ECG08: 480 MS
RBC # BLD AUTO: 4.39 M/UL (ref 3.8–5.2)
SODIUM SERPL-SCNC: 140 MMOL/L (ref 136–145)
TROPONIN I SERPL-MCNC: <0.04 NG/ML
VENTRICULAR RATE, ECG03: 66 BPM
WBC # BLD AUTO: 5.3 K/UL (ref 3.6–11)

## 2018-02-19 PROCEDURE — 80053 COMPREHEN METABOLIC PANEL: CPT | Performed by: STUDENT IN AN ORGANIZED HEALTH CARE EDUCATION/TRAINING PROGRAM

## 2018-02-19 PROCEDURE — 93005 ELECTROCARDIOGRAM TRACING: CPT

## 2018-02-19 PROCEDURE — 84484 ASSAY OF TROPONIN QUANT: CPT | Performed by: STUDENT IN AN ORGANIZED HEALTH CARE EDUCATION/TRAINING PROGRAM

## 2018-02-19 PROCEDURE — 85025 COMPLETE CBC W/AUTO DIFF WBC: CPT | Performed by: STUDENT IN AN ORGANIZED HEALTH CARE EDUCATION/TRAINING PROGRAM

## 2018-02-19 PROCEDURE — 36415 COLL VENOUS BLD VENIPUNCTURE: CPT | Performed by: STUDENT IN AN ORGANIZED HEALTH CARE EDUCATION/TRAINING PROGRAM

## 2018-02-19 PROCEDURE — 71045 X-RAY EXAM CHEST 1 VIEW: CPT

## 2018-02-19 PROCEDURE — 99285 EMERGENCY DEPT VISIT HI MDM: CPT

## 2018-02-19 NOTE — ED NOTES
Discharge instructions discussed with pt. Pt verbalized understanding. Pt assisted into w/c and home with daughter.

## 2018-02-19 NOTE — DISCHARGE INSTRUCTIONS

## 2018-02-19 NOTE — ED PROVIDER NOTES
HPI Comments: 80 y.o. female with past medical history significant for dementia, aortic stenosis, atrial tachycardia, DM, HTN, and foot drop who presents via EMS from [facility] accompanied by her daughter with chief complaint of evaluation post-fall. Per daughter, pt sustained unwitnessed fall from recliner at [facility]. Pt has a history of dementia and is at baseline mentation. She denies pain. Per daughter, pt was brought to the ED by EMS because heart rate was in the 30s on arrival. Pt is noncompliant with prescribed medications secondary to side effects. She was previously followed by Dr. Cara Freitas, cardiology. There are no other acute medical concerns at this time. Full history, physical exam, and ROS unable to be obtained due to:  dementia. Social hx: denies tobacco use; denies EtOH use; denies illicit drug use; resides at [facility]  PCP: Thania Cruz MD  Cardiology: Cara Freitas MD    Note written by Marco Calderón, as dictated by Yvrose Lowe MD 11:35 AM        The history is provided by a relative and the patient (daughter, grandson). History limited by: age. No  was used.         Past Medical History:   Diagnosis Date    Aortic stenosis     Atrial tachycardia (HCC)     Diabetes (HCC)     Ejection fraction < 50% 5/9/2016    Foot drop, left     Glaucoma     Hypercholesterolemia     Hypertension        Past Surgical History:   Procedure Laterality Date    HX APPENDECTOMY  1943    HX BREAST BIOPSY      lumpectomy of left breast-benign 1980's    HX CATARACT REMOVAL      Bilateral    HX GYN      ruptured ovary 1943    HX ORTHOPAEDIC  '96    KNEE; broke left knee    HX ORTHOPAEDIC      BACK; disc removal         Family History:   Problem Relation Age of Onset    Cancer Father        Social History     Social History    Marital status:      Spouse name: N/A    Number of children: N/A    Years of education: N/A Occupational History    Not on file. Social History Main Topics    Smoking status: Never Smoker    Smokeless tobacco: Never Used    Alcohol use No    Drug use: No    Sexual activity: Not Currently     Other Topics Concern    Not on file     Social History Narrative         ALLERGIES: Timolol    Review of Systems   Unable to perform ROS: Dementia       Vitals:    02/19/18 1100   BP: 128/54   Pulse: 79   Resp: 17   Temp: 98.1 °F (36.7 °C)   SpO2: 95%   Height: 5' 6\" (1.676 m)            Physical Exam   Constitutional: She appears well-developed and well-nourished. No distress. Hard of hearing. HENT:   Head: Normocephalic and atraumatic. Nose: Nose normal.   Mouth/Throat: Oropharynx is clear and moist. No oropharyngeal exudate. Eyes: Conjunctivae and EOM are normal. Right eye exhibits no discharge. Left eye exhibits no discharge. No scleral icterus. Neck: Normal range of motion. Neck supple. No JVD present. No tracheal deviation present. No thyromegaly present. Cardiovascular: Normal rate, regular rhythm, normal heart sounds and intact distal pulses. Exam reveals no gallop and no friction rub. No murmur heard. Pulmonary/Chest: Effort normal and breath sounds normal. No stridor. No respiratory distress. She has no wheezes. She has no rales. She exhibits no tenderness. Abdominal: Bowel sounds are normal. She exhibits no distension and no mass. There is no tenderness. There is no rebound. Musculoskeletal: Normal range of motion. She exhibits no edema or tenderness. Lymphadenopathy:     She has no cervical adenopathy. Neurological: She is alert. No cranial nerve deficit. Coordination normal.   Pleasantly demented. Skin: Skin is warm and dry. No rash noted. She is not diaphoretic. No erythema. No pallor. Psychiatric: She has a normal mood and affect. Her behavior is normal. Judgment and thought content normal.   Nursing note and vitals reviewed.   Note written by Flaco Perez Marco, as dictated by Shahana Otoole MD 11:35 AM     MDM  Number of Diagnoses or Management Options  Fall, initial encounter:      Amount and/or Complexity of Data Reviewed  Clinical lab tests: ordered and reviewed  Tests in the radiology section of CPT®: ordered and reviewed  Obtain history from someone other than the patient: yes  Review and summarize past medical records: yes    Risk of Complications, Morbidity, and/or Mortality  Presenting problems: moderate  Diagnostic procedures: moderate  Management options: moderate    Patient Progress  Patient progress: resolved        ED Course       Procedures    ED EKG Interpretation:  Rhythm: undetermined rhythm with bifascicular block. Rate (approx.): 66; Axis: normal; Intervals: normal; ST/T wave: normal.  Note written by Marco Lambert, as dictated by Shahana Otoole MD 12:17 PM    12:46 PM  Discussed available lab and imaging results with patient, daughter, grandson. All verbalize understanding and agree with care plan. Will discharge patient home with specific return precautions; no prescriptions; f/u with PCP.    5:35 PM  The patient has been reevaluated. The patient is ready for discharge. The patient's signs, symptoms, diagnosis, and discharge instructions have been discussed and the patient/ family has conveyed their understanding. The patient is to follow up as recommended or return to the ED should their symptoms worsen. Plan has been discussed and the patient is in agreement. LABORATORY TESTS:  No results found for this or any previous visit (from the past 12 hour(s)). IMAGING RESULTS:  XR CHEST PORT   Final Result        No results found. MEDICATIONS GIVEN:  Medications - No data to display    IMPRESSION:  1. Fall, initial encounter        PLAN:  1. Discharge Medication List as of 2/19/2018 12:45 PM        2.    Follow-up Information     Follow up With Details Comments Contact Sameera Hogue MD  If symptoms worsen [address]  [address] [facility] [place] [facility]  If symptoms worsen [address] [facility]  [phone]          Return to ED for new or worsening symptoms       Jenny Ivy MD

## 2018-02-19 NOTE — ED TRIAGE NOTES
TRIAGE NOTE: Patient arrives via EMS from [facility]. Witnessed slipping out of recliner. Denies hitting head. Patient denies pain from fall. Patient with hx of dementia and is at baseline. Staff sent patient here because patient is noncompliant with medications and irregular heart rhythm.

## 2018-04-12 ENCOUNTER — TELEPHONE (OUTPATIENT)
Dept: INTERNAL MEDICINE CLINIC | Age: 83
End: 2018-04-12

## 2018-04-12 DIAGNOSIS — R35.0 URINARY FREQUENCY: Primary | ICD-10-CM

## 2018-04-12 DIAGNOSIS — Z87.440 HX: UTI (URINARY TRACT INFECTION): ICD-10-CM

## 2018-04-12 NOTE — TELEPHONE ENCOUNTER
Spoke with pt daughter Kiana Tolbert who is on Hippa. 2 pt identifiers. She states that her mom has been c/o urinary frequency, burning,and vaginal itching. Denies temp. Caregiver did an OTC dip and it was positive. She has the materials for a clean catch urine. Orders placed for ua/cx and caregiver Royer Winn will drop off sample as it is very difficult to bring pt in. I will dip the urine as well and if SRJ feels necessary will start abx before weekend.     Orders Placed This Encounter    UA/M W/RFLX CULTURE, ROUTINE

## 2018-04-13 ENCOUNTER — TELEPHONE (OUTPATIENT)
Dept: INTERNAL MEDICINE CLINIC | Age: 83
End: 2018-04-13

## 2018-04-13 DIAGNOSIS — N30.01 ACUTE CYSTITIS WITH HEMATURIA: Primary | ICD-10-CM

## 2018-04-13 RX ORDER — SULFAMETHOXAZOLE AND TRIMETHOPRIM 800; 160 MG/1; MG/1
1 TABLET ORAL 2 TIMES DAILY
Qty: 14 TAB | Refills: 0 | Status: SHIPPED | OUTPATIENT
Start: 2018-04-13 | End: 2018-04-20

## 2018-04-16 LAB — BACTERIA UR CULT: ABNORMAL

## 2018-05-18 ENCOUNTER — TELEPHONE (OUTPATIENT)
Dept: INTERNAL MEDICINE CLINIC | Age: 83
End: 2018-05-18

## 2018-05-18 RX ORDER — SULFAMETHOXAZOLE AND TRIMETHOPRIM 800; 160 MG/1; MG/1
1 TABLET ORAL 2 TIMES DAILY
Qty: 14 TAB | Refills: 0 | Status: SHIPPED | OUTPATIENT
Start: 2018-05-18 | End: 2018-01-01 | Stop reason: SDUPTHER

## 2018-05-18 NOTE — TELEPHONE ENCOUNTER
Per SRJ, rx for Bactrim DS BID x 7days sent to pharm. Pt has hx UTI's. Orders Placed This Encounter    trimethoprim-sulfamethoxazole (BACTRIM DS, SEPTRA DS) 160-800 mg per tablet     Sig: Take 1 Tab by mouth two (2) times a day for 7 days. Dispense:  14 Tab     Refill:  0     The above medication refills were approved via verbal order by Dr. Juliana Guerrero III.

## 2018-05-18 NOTE — TELEPHONE ENCOUNTER
Daughter, Johnny Gant - [phone]  Mother is testing positive for UTI (OTC kit)   Mother is a little \"crazy\", trying to go frequently. Can a script be called into Pharm for her?   Advise

## 2018-05-21 ENCOUNTER — TELEPHONE (OUTPATIENT)
Dept: INTERNAL MEDICINE CLINIC | Age: 83
End: 2018-05-21

## 2018-06-04 NOTE — TELEPHONE ENCOUNTER
----- Message from Meseret Xiong MD sent at 6/4/2018 11:15 AM EDT -----  Regarding: RE: srj         rx  Please refill the last med she took but get a urine specimen.   ----- Message -----     From: Sterling Alvarenga LPN     Sent: 9/2/2438   8:38 AM       To: Meseret Xiong MD  Subject: Lurline RocaSaintclair Imam         rx                                   ----- Message -----     From: Clementelenchoe Cowden     Sent: 6/4/2018   7:46 AM       To: [facility] Pool  Subject: srj         rx                                     Nella Hoover  Female, 80 y.o., 08/22/1919  Weight:   142 lb (64.4 kg)  Phone:   N:[phone]  PCP:   Meseret Xiong MD  MRN:   [id number]  MyChart: Active  Next Appt (With Me)  None  Next Appt (Any Provider)  None     Dr. Timi Meyer  Received: Today      Tccristina Orozco [facility] Front Office Pool                 Yolis Ramsey, caty is calling on behalf of pt to request that a prescription to treat a uti be sent to the [address] [place] [address] pharmacy (p) [phone] as soon as possible, because pt is very confused. Mrs. Alberto Alexander is not able to get the urine sample from the pt at this time, but would like the prescription sent to the pharmacy before it is received. Pt is showing signs that she has a uti. Mrs. Alberto Alexander can be reached at [phone].

## 2018-06-08 NOTE — TELEPHONE ENCOUNTER
Pt daughter Dennis Christine calling following up about the urine sample left from pt and informed her the urine has been misplaced. But to continue the abt the pt was prescribed  and then to bring us another urine sample so we can send that urine sample to the lab. Pt daughter verbalized understanding and stated the pt has 3 more days of abt to go and then will bring the sample in a spare sterile urine cup they say they have. Asked the pt daughter how are the pt symptoms and she states the pt seems to be doing better. Informed her if pt symptoms get worse over the weekend to please call our office to speak to on call provider. Pt daughter verbalized understanding.

## 2018-06-08 NOTE — TELEPHONE ENCOUNTER
Pt's daughter Gigi Bronson informed there is no urine culture from 6/4/18, must have been missed. Pt. Is still taking bactrim, is still hallucinating, is seeing bugs crawling, is itching, no rash present. I offered to send [facility] and she declined. Reminded her we need a clean catch urine brought in once off antibiotic. If Pt. Worsens call Dr asap. Pt. Is not in pain, afebrile.

## 2018-06-15 NOTE — PROGRESS NOTES
Chief Complaint   Patient presents with    UTI     Nurse visit - Pt family member dropped off sterile urine clean catch for evaluation of uti sx.

## 2018-06-25 NOTE — PROGRESS NOTES
Chief Complaint   Patient presents with    UTI     frequent UTI. Patient caretaker brings in clean catch urine dx:  Frequent UTI's. Results for orders placed or performed in visit on 06/25/18   AMB POC URINALYSIS DIP STICK AUTO W/O MICRO     Status: None   Result Value Ref Range Status    Color (UA POC) Yellow  Final    Clarity (UA POC) Clear  Final    Glucose (UA POC) Negative Negative Final    Bilirubin (UA POC) Negative Negative Final    Ketones (UA POC) Negative Negative Final    Specific gravity (UA POC) 1.015 1.001 - 1.035 Final    Blood (UA POC) Negative Negative Final    pH (UA POC) 7.0 4.6 - 8.0 Final    Protein (UA POC) Negative Negative Final    Urobilinogen (UA POC) 0.2 mg/dL 0.2 - 1 Final    Nitrites (UA POC) Negative Negative Final    Leukocyte esterase (UA POC) 1+ Negative Final     Orders Placed This Encounter    UA/M W/RFLX CULTURE, ROUTINE    AMB POC URINALYSIS DIP STICK AUTO W/O MICRO     The above orders were approved via verbal order by Dr. Rosie Patton III.

## 2018-06-25 NOTE — PROGRESS NOTES
Received call from pt's daughter, Jj Hall (on [address])- verified with 2 identifiers. Reports that her mother has a hx of frequent UTI's, last finished a course of Bactrim about 2 weeks ago. Started with some frequency on Saturday night and then Sunday developed intermittent confusion. Her sister just dropped off a urine specimen to [facility]. Pt continues to have intermittent confusion today. Denies fever, chills, diaphoresis, CP, SOB, visible hematuria, N/V/D. Reviewed concern of AMS > 24 hours and suggested ED, but they were interested in [address] initially and if they examine and determine she needs to go to ED, then they will go. Referral placed. Currently they are not able to be to pt's home until 6:30 pm.     Reviewed Red flags with pt's daughter and if any symptoms worsen, to call 911. Pt has 24/7 caregivers in her home- encouraged to keep pt hydrated as much as possible. Pt may benefit from [facility] in near future. Has not been to [facility] since 12/2017.

## 2018-06-25 NOTE — TELEPHONE ENCOUNTER
On Call Note  Pt's daughter calls for a complaint of a possible UTI. She reports that the patient had the onset of urinary frequency Saturday night and that she was unable to get any sleep. At the time, her daughter says that she was very distraught and somewhat confused. At the time of our discussion, she says that her mother is calm and lucid. She denies bladder pain or dysuria. She has done a home UTI test which was positive for leuk esterase and negative for nitrite. After a lengthy discussion, the decision was made for her to come to the office for a clean catch and a visit on Monday.

## 2018-06-26 PROBLEM — E11.21 TYPE 2 DIABETES WITH NEPHROPATHY (HCC): Status: ACTIVE | Noted: 2018-01-01

## 2018-06-26 NOTE — PROGRESS NOTES
HPI:  Melany Pham is a 80y.o. year old female who is here for a concern about her change of mental status. Over the last 3 days she has had agitation during the evening time on a couple nights and then over the last 2 days has been more sedated and sleeping throughout the day. Yesterday she provided a urine specimen to us via her family and it showed trace of leukocyte esterase only. She was seen by the [facility] yesterday and evaluated. Chest x-ray was done and on arrival in the office today we were able to get a report that showed no evidence of pneumonia. She became combative when they tried to draw blood yesterday and this was not done. Today she awakened and ate breakfast.  She drank her liquids without difficulty. She has been somnolent since then. The home provider suggested emergency room evaluation and her family refused. Her daughters with her today and wishes to avoid that if the possible. She does have caregivers with her at all times now. She is not sure she wants her to go back to the emergency room for any particular reason at all now. In the office today she was arousable and denied any complaints to me. She knew who I was, denied pain, denied fever, chills, cough, sputum, or change in bowel habits. She did report some urinary frequency without dysuria or hematuria.       Past Medical History:   Diagnosis Date    Aortic stenosis     Atrial tachycardia (HCC)     [facility] [address]     Ejection fraction < 50% 5/9/2016    Foot drop, left     Glaucoma     Hypercholesterolemia     Hypertension        Past Surgical History:   Procedure Laterality Date    HX APPENDECTOMY  1943    HX BREAST BIOPSY      lumpectomy of left breast-benign 1980's    HX CATARACT REMOVAL      Bilateral    HX GYN      ruptured ovary 1943    HX ORTHOPAEDIC  '96    KNEE; broke left knee    HX ORTHOPAEDIC      BACK; disc removal       Prior to Admission medications    Medication Sig Start Date End Date Taking? Authorizing Provider   trimethoprim-sulfamethoxazole (BACTRIM DS, SEPTRA DS) 160-800 mg per tablet Take 1 Tab by mouth two (2) times a day for 7 days. 6/26/18 7/3/18 Yes Lynn Lopez MD   acetaminophen (TYLENOL EXTRA STRENGTH) 500 mg tablet Take 2 Tabs by mouth every six (6) hours as needed for Pain. 1/21/16  Yes Luiz Bumpers, MD   brinzolamide (AZOPT) 1 % ophthalmic suspension Administer 1 Drop to left eye two (2) times a day. Yes Historical Provider   brimonidine (ALPHAGAN P) 0.1 % ophthalmic solution Administer 1 Drop to left eye three (3) times daily. Yes Historical Provider   latanoprost (XALATAN) 0.005 % ophthalmic solution Administer 1 Drop to left eye nightly. Yes Historical Provider   glucose blood VI test strips (BLOOD GLUCOSE TEST) strip For use with [employer] glucometer. Pt will check BS twice a day. Dx:  E11.8. 2/7/18   Lynn Lopez MD   JANUVIA 100 mg tablet TAKE 1/2 TABLET BY MOUTH DAILY 1/9/18   Markos Damico III, MD   donepezil (ARICEPT) 5 mg tablet TAKE ONE TABLET BY MOUTH NIGHTLY 4/19/17   Markos Damico III, MD   LOPERAMIDE HCL (IMODIUM PO) Take  by mouth. Historical Provider   gentamicin (GARAMYCIN) 0.3 % ophthalmic solution Administer 2 Drops to left eye four (4) times daily. 11/10/16   Markos Damico III, MD   ALPRAZolam [place]gas [place]) 0.25 mg tablet TAKE ONE-HALF TABLET BY MOUTH 2 TIMES A DAY AS NEEDED 8/17/16   Markos Damico III, MD   propranolol (INDERAL) 40 mg tablet TAKE ONE TABLET BY MOUTH 2 TIMES A DAY 6/10/16   Geoff Saul NP   PARoxetine (PAXIL) 10 mg tablet TAKE ONE TABLET BY MOUTH EVERY DAY 3/10/16   Lynn Lopez MD   Vit A,C,E-Zinc-Copper (PRESERVISION) cap capsule Take 1 Cap by mouth. Historical Provider   Wheat Dextrin (BENEFIBER) 1 gram chew Take 1 Tab by mouth daily. 11/5/15   Markos Damico III, MD   LACTOBACILLUS ACIDOPHILUS (PROBIOTIC PO) Take  by mouth daily.     Historical Provider   aspirin delayed-release 81 mg tablet Take 81 mg by mouth daily. Historical Provider   cholecalciferol (VITAMIN D3) 1,000 unit tablet Take 1,000 Units by mouth daily. Historical Provider       Social History     Social History    Marital status:      Spouse name: N/A    Number of children: N/A    Years of education: N/A     Occupational History    Not on file. Social History Main Topics    Smoking status: Never Smoker    Smokeless tobacco: Never Used    Alcohol use No    Drug use: No    Sexual activity: Not Currently     Other Topics Concern    Not on file     Social History Narrative          ROS  Per HPI    Visit Vitals    /74    Pulse (!) 58    Temp 97.9 °F (36.6 °C) (Oral)    Resp 10    Ht 5' 6\" (1.676 m)    SpO2 98%         Physical Exam   Physical Examination: General appearance - alert, well appearing, and in no distress  Mouth - mucous membranes moist, pharynx normal without lesions  Neck - supple, no significant adenopathy  Chest - clear to auscultation, no wheezes, rales or rhonchi, symmetric air entry  Heart - normal rate and regular rhythm  Abdomen - soft, nontender, nondistended, no masses or organomegaly  Extremities - peripheral pulses normal, no pedal edema, no clubbing or cyanosis      Assessment/Plan:  Diagnoses and all orders for this visit:    1. Controlled type 2 diabetes mellitus without complication, without long-term current use of insulin ([facility] [address] - check labs. 2. POLA (acute kidney injury) ([facility] [address]  -     METABOLIC PANEL, COMPREHENSIVE  -     CBC WITH AUTOMATED DIFF    3. Frequent UTI - will check culture when available but treat pending it. -     METABOLIC PANEL, COMPREHENSIVE  -     CBC WITH AUTOMATED DIFF    4. Essential hypertension    5. Altered mental status, unspecified altered mental status type - will report how this going in the next few days. May consider hospice. Other orders  -     trimethoprim-sulfamethoxazole (BACTRIM DS, SEPTRA DS) 160-800 mg per tablet;  Take 1 Tab by mouth two (2) times a day for 7 days. Follow-up Disposition: as needed. Advised her to call back or return to office if symptoms worsen/change/persist.  Discussed expected course/resolution/complications of diagnosis in detail with patient. Medication risks/benefits/costs/interactions/alternatives discussed with patient. She was given an after visit summary which includes diagnoses, current medications, & vitals. She expressed understanding with the diagnosis and plan.

## 2018-06-28 NOTE — PROGRESS NOTES
Case discussed with NN. Pt does not have any qualifying dx for hospice at this time. Also, daughter Ania Roberts reports improvement on 6/28. Will defer hospice for now.

## 2018-06-28 NOTE — TELEPHONE ENCOUNTER
Spoke with pt daughter Blanca Nguyen who is on [employer]. 2 pt identifiers. Advised that per SRJ, labs look okay. To continue with Bactrim as prescribed. She states that pt has improved since ov on 6/26. She is more alert and has been eating in dining room. Red flags reviewed. Return to clinic as needed.

## 2018-06-28 NOTE — TELEPHONE ENCOUNTER
----- Message from Dov Ko MD sent at 6/26/2018  7:39 PM EDT -----  Notify labs are OK. See how she does with the UTI treatment.

## 2018-07-28 NOTE — ED NOTES
NEIL called for transportation back to [facility]. An ETA of 90 min was provided.

## 2018-07-28 NOTE — ED PROVIDER NOTES
HPI Comments: 80 y.o. female with past medical history significant for Atrial tachycardia, Aortic stenosis, HTN, DM, Foot drop (left) who presents from [facility] via EMS for evaluation s/p fall. Per ED nursing staff, EMS reports, pt seated on commode at nursing facility and slipped off. Pt states that she struck her head and c/o skin tear to left arm. Pt with b/l ankle swelling of unknown chronicity. Pt takes baby aspirin daily. Pt denies chest pain or abdominal pain. There are no other acute medical concerns at this time. Full history, physical exam, and ROS unable to be obtained due to: Dementia PCP: Chalino Zapata MD 
 
Note written by Marco Hernandez, as dictated by Rosemary Garcia MD 11:15 PM 
  
 
 
The history is provided by the patient and the nursing home. The history is limited by the condition of the patient. No  was used. Past Medical History:  
Diagnosis Date  Aortic stenosis  Atrial tachycardia ([facility] [address]  Diabetes ([facility] [address]  Ejection fraction < 50% 5/9/2016  Foot drop, left  Glaucoma  Hypercholesterolemia  Hypertension Past Surgical History:  
Procedure Laterality Date  HX APPENDECTOMY  1943  HX BREAST BIOPSY    
 lumpectomy of left breast-benign 1980's  HX CATARACT REMOVAL Bilateral  
 HX GYN    
 ruptured ovary 1943 [facility] OR[facility]  '96 KNEE; broke left knee  HX ORTHOPAEDIC    
 BACK; disc removal  
 
   
Family History:  
Problem Relation Age of Onset  Cancer Father Social History Social History  Marital status:  Spouse name: N/A  
 Number of children: N/A  
 Years of education: N/A Occupational History  Not on file. Social History Main Topics  Smoking status: Never Smoker  Smokeless tobacco: Never Used  Alcohol use No  
 Drug use: No  
 Sexual activity: Not Currently Other Topics Concern  Not on file Social History Narrative ALLERGIES: [place] Review of Systems Unable to perform ROS: Dementia Vitals:  
 07/27/18 2233 BP: 136/55 Pulse: 95 Resp: 16 Temp: 97.6 °F (36.4 °C) SpO2: 100% Weight: 64.4 kg (142 lb) Height: 5' 6\" (1.676 m) Physical Exam  
Nursing note and vitals reviewed. CONSTITUTIONAL: Well-appearing; well-nourished; in no apparent distress HEAD: Normocephalic; atraumatic EYES: PERRL; EOM intact; conjunctiva and sclera are clear bilaterally. ENT: No rhinorrhea; normal pharynx with no tonsillar hypertrophy; mucous membranes pink/moist, no erythema, no exudate. NECK: Supple; non-tender; no cervical lymphadenopathy CARD: Normal S1, S2; no murmurs, rubs, or gallops. Regular rate and rhythm. RESP: Normal respiratory effort; breath sounds clear and equal bilaterally; no wheezes, rhonchi, or rales. ABD: Normal bowel sounds; non-distended; non-tender; no palpable organomegaly, no masses, no bruits. Back Exam: Normal inspection; no vertebral point tenderness, no CVA tenderness. Normal range of motion. EXT: Normal ROM in all four extremities; non-tender to palpation; no swelling or deformity; distal pulses are normal, no edema. SKIN: Warm; dry; 2 cm superficial skin tear left wrist 
NEURO:Alert and oriented x 3, coherent, ADRIANO-XII grossly intact, sensory and motor are non-focal. 
 
 
 
MDM Number of Diagnoses or Management Options Contusion of left wrist, initial encounter:  
Fall, initial encounter:  
Minor head injury, initial encounter:  
Tear of skin of left wrist, initial encounter:  
Diagnosis management comments: Assessment: fall with head injury/ skin tear to the left wrist/ left wrist and ankle injury rule out fracture Plan: CT scan of the head and cervical spine/ x-ray of the left hip and ankle/ wound care and repair of Skin tear/ serial exam/ Monitor and Reevaluate. ED Course Wound Closure by Adhesive Date/Time: 7/28/2018 12:45 AM 
Performed by: Huang Kohli Authorized by: Huang Kohli  
 
Consent:  
  Consent obtained:  Verbal 
  Consent given by:  Patient Risks discussed:  Infection Alternatives discussed:  No treatment Anesthesia (see MAR for exact dosages): Anesthesia method:  None Laceration details: Location: left wrist. 
  Length (cm):  1 Laceration depth: superficial. 
Repair type:  
  Repair type:  Simple Pre-procedure details: Preparation:  Patient was prepped and draped in usual sterile fashion Exploration:  
  Contaminated: no   
Treatment:  
  Area cleansed with:  Saline Amount of cleaning:  Standard Irrigation solution:  Sterile saline Visualized foreign bodies/material removed: no   
Skin repair:  
  Repair method:  Steri-Strips Approximation:  
  Approximation:  Close Vermilion border: well-aligned Post-procedure details:  
  Dressing:  Bulky dressing XRAY INTERPRETATION (ED MD) Xray of Left wrist shows no fracture. No subluxation/dislocation. No bony abnormality. Kelly Cerna MD 11:12 PM 
 
 
XRAY INTERPRETATION (ED MD) Xray of Left ankle shows no fracture. No subluxation/dislocation. No bony abnormality. Kelly Cerna MD 11:12 PM 
 
 
Progress Note:  
Pt has been reexamined by Kelly Cerna MD. Pt is feeling much better. Symptoms have improved. All available results have been reviewed with pt and any available family. Pt understands sx, dx, and tx in ED. Care plan has been outlined and questions have been answered. Pt is ready to go home. Will send home on Fall/ head injury/ skin tear/ contusion of the left wrist and ankle instruction. Prescription of Tylenol. Outpatient referral with PCP as needed. Written by Kelly Cerna MD,11:13 PM 
 
. Emily Carbajal

## 2018-07-28 NOTE — ED TRIAGE NOTES
Patient arrives via EMS from [facility] after being in the bathroom and slipping off toliet. PAtient states she was trying to use her walker. Denies LOC, hitting head, or neck pain. Hx of dementia. Patient arrives alert and oriented to person, place, and situation. Reports LEFT wrist pain with skin tear and LEFT ankle pain.

## 2018-07-28 NOTE — ED NOTES
Bedside and Verbal shift change report given to Tanna Herrera (oncoming nurse) by Brigid Delgado RN (offgoing nurse). Report included the following information SBAR, ED Summary, MAR and Recent Results.

## 2018-07-28 NOTE — DISCHARGE INSTRUCTIONS
Preventing Falls: Care Instructions  Your Care Instructions    Getting around your home safely can be a challenge if you have injuries or health problems that make it easy for you to fall. Loose rugs and furniture in walkways are among the dangers for many older people who have problems walking or who have poor eyesight. People who have conditions such as arthritis, osteoporosis, or dementia also have to be careful not to fall. You can make your home safer with a few simple measures. Follow-up care is a key part of your treatment and safety. Be sure to make and go to all appointments, and call your doctor if you are having problems. It's also a good idea to know your test results and keep a list of the medicines you take. How can you care for yourself at home? Taking care of yourself  · You may get dizzy if you do not drink enough water. To prevent dehydration, drink plenty of fluids, enough so that your urine is light yellow or clear like water. Choose water and other caffeine-free clear liquids. If you have kidney, heart, or liver disease and have to limit fluids, talk with your doctor before you increase the amount of fluids you drink. · Exercise regularly to improve your strength, muscle tone, and balance. Walk if you can. Swimming may be a good choice if you cannot walk easily. · Have your vision and hearing checked each year or any time you notice a change. If you have trouble seeing and hearing, you might not be able to avoid objects and could lose your balance. · Know the side effects of the medicines you take. Ask your doctor or pharmacist whether the medicines you take can affect your balance. Sleeping pills or sedatives can affect your balance. · Limit the amount of alcohol you drink. Alcohol can impair your balance and other senses. · Ask your doctor whether calluses or corns on your feet need to be removed.  If you wear loose-fitting shoes because of calluses or corns, you can lose your balance and fall. · Talk to your doctor if you have numbness in your feet. Preventing falls at home  · Remove raised doorway thresholds, throw rugs, and clutter. Repair loose carpet or raised areas in the floor. · Move furniture and electrical cords to keep them out of walking paths. · Use nonskid floor wax, and wipe up spills right away, especially on ceramic tile floors. · If you use a walker or cane, put rubber tips on it. If you use crutches, clean the bottoms of them regularly with an abrasive pad, such as steel wool. · Keep your house well lit, especially [place], and outside walkways. Use night-lights in areas such as hallways and bathrooms. Add extra light switches or use remote switches (such as switches that go on or off when you clap your hands) to make it easier to turn lights on if you have to get up during the night. · Install sturdy handrails on stairways. · Move items in your cabinets so that the things you use a lot are on the lower shelves (about waist level). · Keep a cordless phone and a flashlight with new batteries by your bed. If possible, put a phone in each of the main rooms of your house, or carry a cell phone in case you fall and cannot reach a phone. Or, you can wear a device around your neck or wrist. You push a button that sends a signal for help. · Wear low-heeled shoes that fit well and give your feet good support. Use footwear with nonskid soles. Check the heels and soles of your shoes for wear. Repair or replace worn heels or soles. · Do not wear socks without shoes on wood floors. · Walk on the grass when the sidewalks are slippery. If you live in an area that gets snow and ice in the winter, sprinkle salt on slippery steps and sidewalks. Preventing falls in the bath  · Install grab bars and nonskid mats inside and outside your shower or tub and near the toilet and sinks. · Use shower chairs and bath benches.   · Use a hand-held shower head that will allow you to sit while showering. · Get into a tub or shower by putting the weaker leg in first. Get out of a tub or shower with your strong side first.  · Repair loose toilet seats and consider installing a raised toilet seat to make getting on and off the toilet easier. · Keep your bathroom door unlocked while you are in the shower. Where can you learn more? Go to http://bassam-jasper.info/. Enter 0476 [id number] in the search box to learn more about \"Preventing Falls: Care Instructions. \"  Current as of: May 12, 2017  Content Version: 11.7  © 4893-8905 [employer]. Care instructions adapted under license by [employer] (which disclaims liability or warranty for this information). If you have questions about a medical condition or this instruction, always ask your healthcare professional. [place] [address] any warranty or liability for your use of this information. Learning About a Closed Head Injury  What is a closed head injury? A closed head injury happens when your head gets hit hard. The strong force of the blow causes your brain to shake in your skull. This movement can cause the brain to bruise, swell, or tear. Sometimes nerves or blood vessels also get damaged. This can cause bleeding in or around the brain. A concussion is a type of closed head injury. What are the symptoms? If you have a mild concussion, you may have a mild headache or feel \"not quite right. \" These symptoms are common. They usually go away over a few days to 4 weeks. But sometimes after a concussion, you feel like you can't function as well as before the injury. And you have new symptoms. This is called postconcussive syndrome. You may:  · Find it harder to solve problems, think, concentrate, or remember. · Have headaches. · Have changes in your sleep patterns, such as not being able to sleep or sleeping all the time. · Have changes in your personality.   · Not be interested in your usual activities. · Feel angry or anxious without a clear reason. · Lose your sense of taste or smell. · Be dizzy, lightheaded, or unsteady. It may be hard to stand or walk. How is a closed head injury treated? Any person who may have a concussion needs to see a doctor. Some people have to stay in the hospital to be watched. Others can go home safely. If you go home, follow your doctor's instructions. He or she will tell you if you need someone to watch you closely for the next 24 hours or longer. Rest is the best treatment. Get plenty of sleep at night. And try to rest during the day. · Avoid activities that are physically or mentally demanding. These include housework, exercise, and schoolwork. And don't play video games, send text messages, or use the computer. You may need to change your school or work schedule to be able to avoid these activities. · Ask your doctor when it's okay to drive, ride a bike, or operate machinery. · Take an over-the-counter pain medicine, such as acetaminophen (Tylenol), ibuprofen (Advil, Motrin), or naproxen (Aleve). Be safe with medicines. Read and follow all instructions on the label. · Check with your doctor before you use any other medicines for pain. · Do not drink alcohol or use illegal drugs. They can slow recovery. They can also increase your risk of getting a second head injury. Follow-up care is a key part of your treatment and safety. Be sure to make and go to all appointments, and call your doctor if you are having problems. It's also a good idea to know your test results and keep a list of the medicines you take. Where can you learn more? Go to http://bassam-jasper.info/. Enter E235 in the search box to learn more about \"Learning About a Closed Head Injury. \"  Current as of: October 9, 2017  Content Version: 11.7  © 1966-3909 [employer].  Care instructions adapted under license by [employer] (which disclaims liability or warranty for this information). If you have questions about a medical condition or this instruction, always ask your healthcare professional. [address] any warranty or liability for your use of this information. Cuts Closed With Stitches: Care Instructions  Your Care Instructions  A cut can happen anywhere on your body. The doctor used stitches to close the cut. Using stitches also helps the cut heal and reduces scarring. Sometimes pieces of tape called Steri-Strips are put over the stitches. If the cut went deep and through the skin, the doctor may have put in two layers of stitches. The deeper layer brings the deep part of the cut together. These stitches will dissolve and don't need to be removed. The stitches in the upper layer are the ones you see on the cut. You will probably have a bandage over the stitches. You will need to have the stitches removed, usually in 7 to 14 days. The doctor has checked you carefully, but problems can develop later. If you notice any problems or new symptoms, get medical treatment right away. Follow-up care is a key part of your treatment and safety. Be sure to make and go to all appointments, and call your doctor if you are having problems. It's also a good idea to know your test results and keep a list of the medicines you take. How can you care for yourself at home? · Keep the cut dry for the first 24 to 48 hours. After this, you can shower if your doctor okays it. Pat the cut dry. · Don't soak the cut, such as in a bathtub. Your doctor will tell you when it's safe to get the cut wet. · If your doctor told you how to care for your cut, follow your doctor's instructions. If you did not get instructions, follow this general advice:  ¨ After the first 24 to 48 hours, wash around the cut with clean water 2 times a day. Don't use hydrogen peroxide or alcohol, which can slow healing.   ¨ You may cover the cut with a thin layer of petroleum jelly, such as Vaseline, and a nonstick bandage. ¨ Apply more petroleum jelly and replace the bandage as needed. · Prop up the sore area on a pillow anytime you sit or lie down during the next 3 days. Try to keep it above the level of your heart. This will help reduce swelling. · Avoid any activity that could cause your cut to reopen. · Do not remove the stitches on your own. Your doctor will tell you when to come back to have the stitches removed. · Leave Steri-Strips on until they fall off. · Be safe with medicines. Read and follow all instructions on the label. ¨ If the doctor gave you a prescription medicine for pain, take it as prescribed. ¨ If you are not taking a prescription pain medicine, ask your doctor if you can take an over-the-counter medicine. When should you call for help? Call your doctor now or seek immediate medical care if:    · You have new pain, or your pain gets worse.     · The skin near the cut is cold or pale or changes color.     · You have tingling, weakness, or numbness near the cut.     · The cut starts to bleed, and blood soaks through the bandage. Oozing small amounts of blood is normal.     · You have trouble moving the area near the cut.     · You have symptoms of infection, such as:  ¨ Increased pain, swelling, warmth, or redness around the cut. ¨ Red streaks leading from the cut. ¨ Pus draining from the cut. ¨ A fever.    Watch closely for changes in your health, and be sure to contact your doctor if:    · The cut reopens.     · You do not get better as expected. Where can you learn more? Go to http://bassam-jasper.info/. Enter R217 in the search box to learn more about \"Cuts Closed With Stitches: Care Instructions. \"  Current as of: November 20, 2017  Content Version: 11.7  © 6047-2234 [employer].  Care instructions adapted under license by [employer] (which disclaims liability or warranty for this information). If you have questions about a medical condition or this instruction, always ask your healthcare professional. [place] [address] any warranty or liability for your use of this information.

## 2018-07-28 NOTE — ED NOTES
Attempted to contact staff at [facility] to give report for transfer of care. Unable to reach any staff. Patient's aid at the bedside stated there is no staff there after 1930.

## 2018-11-12 NOTE — TELEPHONE ENCOUNTER
From: Minnie Carter  To: Amado Mauro MD  Sent: 11/12/2018 6:50 AM EST  Subject: Non-Urgent Medical Question    This message is being sent by Caroline Tang on behalf of Minnie Andino. Mom tested very positive for a UTI on the over the counter tests strips. ( over the weekend of course!!!). If i can get a clean catch urine sample in one of the sterile containers can you please leave a lab slip at the ? [place]

## 2018-11-18 NOTE — TELEPHONE ENCOUNTER
On call: Patient's daughter Monique Hightower, calls stating that her mother is being treated for urinary tract infection and started 1 day ago on an antibiotic and last night had nightmares and has been acting confused. She states that her mother does on and off have confusion and she has not seen her mother today but has only heard this from her mother's caretakers. She is counseled that if her mother has new changes in her mental status and is being treated for a UTI she should be seen today in the emergency room for further evaluation. Daughter plans to check on her mother and will make a decision based on how she is doing. She should remain on abx. She states her understanding of recommendations.

## 2018-11-19 PROBLEM — R41.0 DELIRIUM: Status: ACTIVE | Noted: 2018-01-01

## 2018-11-19 NOTE — PROGRESS NOTES
HPI: 
Jordan Basilio is a 80y.o. year old female who is here for a visit to discuss recently diagnosed urinary tract infection. She has had intermittent episodes of delirium now for months. At times they have been associated with UTIs and at times not. Last week her daughter brought in a urine specimen and it was consistent with significant UTI that was sensitive to Macrobid. She was placed on that last week but has developed increasing issues with confusion, hallucinations, and sleeping a lot. For this reason she stopped the Macrobid on her own on Saturday and started giving her Bactrim that she had at home. Her last culture was resistant to Bactrim. She reports that she eats and drinks well when she is alert and at times she sleeping for 2 days at the time difficult to arouse and hydrate. She has stopped the majority of her medications as she refuses to take them. She did not want to come in today to be seen but her daughter forced that. She is not right at the moment eating and drinking reasonably well. No vomiting. No melena or hematochezia. No obvious hematuria. Past Medical History:  
Diagnosis Date  Aortic stenosis  [facility] [address]  Diabetes ([facility] [address]  Ejection fraction < 50% 5/9/2016  Foot drop, left  Glaucoma  Hypercholesterolemia  Hypertension Past Surgical History:  
Procedure Laterality Date  HX APPENDECTOMY  1943  HX BREAST BIOPSY    
 lumpectomy of left breast-benign 1980's  HX CATARACT REMOVAL Bilateral  
 HX GYN    
 ruptured ovary 1943 [facility] KNEE; broke left knee  HX ORTHOPAEDIC    
 BACK; disc removal  
 
 
Prior to Admission medications Medication Sig Start Date End Date Taking? Authorizing Provider  
ciprofloxacin HCl (CIPRO) 250 mg tablet Take 1 Tab by mouth two (2) times a day.  11/19/18  Yes Lanette Rubio MD  
brinzolamide (AZOPT) 1 % ophthalmic suspension Administer 1 Drop to left eye two (2) times a day. Yes Provider, Historical  
brimonidine (ALPHAGAN P) 0.1 % ophthalmic solution Administer 1 Drop to left eye three (3) times daily. Yes Provider, Historical  
latanoprost (XALATAN) 0.005 % ophthalmic solution Administer 1 Drop to left eye nightly. Yes Provider, Historical  
 
 
Social History Socioeconomic History  Marital status:  Spouse name: Not on file  Number of children: Not on file  Years of education: Not on file  Highest education level: Not on file Social Needs  Financial resource strain: Not on file  Food insecurity - worry: Not on file  Food insecurity - inability: Not on file  Transportation needs - medical: Not on file  Transportation needs - non-medical: Not on file Occupational History  Not on file Tobacco Use  Smoking status: Never Smoker  Smokeless tobacco: Never Used Substance and Sexual Activity  Alcohol use: No  
 Drug use: No  
 Sexual activity: Not Currently Other Topics Concern  Not on file Social History Narrative  Not on file ROS The daughter refused an emergency room visit on Saturday due to her concerns about exposures and the fact that she does not want to put her mother through that. Visit Vitals /71 Pulse (!) 58 Temp 98.7 °F (37.1 °C) (Oral) SpO2 98% Physical Exam  
Physical Examination: General appearance - alert, well appearing, and in no distress Mouth - mucous membranes moist, pharynx normal without lesions Neck - supple, no significant adenopathy Chest - clear to auscultation, no wheezes, rales or rhonchi, symmetric air entry Heart - normal rate, regular rhythm, normal S1, S2, no murmurs, rubs, clicks or gallops Abdomen - soft, nontender, nondistended, no masses or organomegaly Extremities - peripheral pulses normal, no pedal edema, no clubbing or cyanosis Assessment/Plan: 1.  Recurrent urinary tract infectionwe will switch to Cipro today and treat for 7 days. If she has recurrent symptoms we will have the daughter do a home test for UTI and treat as needed without having to make her come in to be seen. 2.  Deliriumappears to be multifactorial including her significant vision loss, hearing loss, and infection. At this point I discussed using Seroquel and the daughter does not wish to do that for now. Would reconsider in the future. 3.  Diabetesrefuses medications at current and will defer any further treatment. 4.? Katt Santos will arrange for consideration of palliative care consult at home. Her daughter is aware that her prognosis is poor given the current situation. She does have 24-hour a day caregivers with her and I encouraged that as I do not think she could be left alone at home Follow-up Disposition: 
Return if symptoms worsen or fail to improve. Advised her to call back or return to office if symptoms worsen/change/persist. 
Discussed expected course/resolution/complications of diagnosis in detail with patient. Medication risks/benefits/costs/interactions/alternatives discussed with patient. She was given an after visit summary which includes diagnoses, current medications, & vitals. She expressed understanding with the diagnosis and plan.

## 2018-11-19 NOTE — TELEPHONE ENCOUNTER
At Dr. Whit Gavin' request, reached out to pt's daughter, Lea Mejia to discuss Palliative vs Hospice. Reviewed the differences between the two services. Lea Mejia does not feel that she needs Palliative at this point since she was able to get her mom into [facility] today. She knows that this may not be the case in the future. She has the info for [employer]. Provided her the info for [facility] in the event she wants an info session. Reminded her to reach out to PCP/NN for any future changes or needs.

## 2019-01-01 ENCOUNTER — HOME CARE VISIT (OUTPATIENT)
Dept: HOSPICE | Facility: HOSPICE | Age: 84
End: 2019-01-01
Payer: MEDICARE

## 2019-01-01 ENCOUNTER — HOME CARE VISIT (OUTPATIENT)
Dept: SCHEDULING | Facility: HOME HEALTH | Age: 84
End: 2019-01-01
Payer: MEDICARE

## 2019-01-01 ENCOUNTER — HOSPICE ADMISSION (OUTPATIENT)
Dept: HOSPICE | Facility: HOSPICE | Age: 84
End: 2019-01-01
Payer: MEDICARE

## 2019-01-01 ENCOUNTER — APPOINTMENT (OUTPATIENT)
Dept: CT IMAGING | Age: 84
End: 2019-01-01
Attending: EMERGENCY MEDICINE
Payer: MEDICARE

## 2019-01-01 ENCOUNTER — TELEPHONE (OUTPATIENT)
Dept: PALLATIVE CARE | Age: 84
End: 2019-01-01

## 2019-01-01 ENCOUNTER — OFFICE VISIT (OUTPATIENT)
Dept: INTERNAL MEDICINE CLINIC | Age: 84
End: 2019-01-01

## 2019-01-01 ENCOUNTER — TELEPHONE (OUTPATIENT)
Dept: INTERNAL MEDICINE CLINIC | Age: 84
End: 2019-01-01

## 2019-01-01 ENCOUNTER — HOME CARE VISIT (OUTPATIENT)
Dept: HOME HEALTH SERVICES | Facility: HOME HEALTH | Age: 84
End: 2019-01-01
Payer: MEDICARE

## 2019-01-01 ENCOUNTER — HOSPITAL ENCOUNTER (EMERGENCY)
Age: 84
Discharge: HOME | End: 2019-04-16
Attending: EMERGENCY MEDICINE | Admitting: EMERGENCY MEDICINE
Payer: MEDICARE

## 2019-01-01 ENCOUNTER — HOME VISIT (OUTPATIENT)
Dept: PALLATIVE CARE | Age: 84
End: 2019-01-01

## 2019-01-01 ENCOUNTER — HOME HEALTH ADMISSION (OUTPATIENT)
Dept: HOME HEALTH SERVICES | Facility: HOME HEALTH | Age: 84
End: 2019-01-01
Payer: MEDICARE

## 2019-01-01 ENCOUNTER — NURSE NAVIGATOR (OUTPATIENT)
Dept: PALLATIVE CARE | Age: 84
End: 2019-01-01

## 2019-01-01 ENCOUNTER — APPOINTMENT (OUTPATIENT)
Dept: GENERAL RADIOLOGY | Age: 84
End: 2019-01-01
Attending: EMERGENCY MEDICINE
Payer: MEDICARE

## 2019-01-01 ENCOUNTER — HOSPITAL ENCOUNTER (OUTPATIENT)
Dept: LAB | Age: 84
Discharge: HOME | End: 2019-02-08
Payer: MEDICARE

## 2019-01-01 VITALS
DIASTOLIC BLOOD PRESSURE: 50 MMHG | TEMPERATURE: 98.3 F | HEART RATE: 66 BPM | OXYGEN SATURATION: 97 % | RESPIRATION RATE: 16 BRPM | SYSTOLIC BLOOD PRESSURE: 119 MMHG

## 2019-01-01 VITALS
HEART RATE: 82 BPM | DIASTOLIC BLOOD PRESSURE: 74 MMHG | OXYGEN SATURATION: 96 % | TEMPERATURE: 98.4 F | SYSTOLIC BLOOD PRESSURE: 140 MMHG | RESPIRATION RATE: 18 BRPM

## 2019-01-01 VITALS
SYSTOLIC BLOOD PRESSURE: 180 MMHG | DIASTOLIC BLOOD PRESSURE: 80 MMHG | RESPIRATION RATE: 20 BRPM | HEART RATE: 84 BPM | OXYGEN SATURATION: 97 %

## 2019-01-01 VITALS
TEMPERATURE: 98.4 F | DIASTOLIC BLOOD PRESSURE: 70 MMHG | SYSTOLIC BLOOD PRESSURE: 120 MMHG | OXYGEN SATURATION: 97 % | HEART RATE: 74 BPM | RESPIRATION RATE: 16 BRPM

## 2019-01-01 VITALS
DIASTOLIC BLOOD PRESSURE: 80 MMHG | OXYGEN SATURATION: 91 % | HEART RATE: 95 BPM | TEMPERATURE: 98 F | SYSTOLIC BLOOD PRESSURE: 120 MMHG

## 2019-01-01 VITALS
DIASTOLIC BLOOD PRESSURE: 91 MMHG | HEART RATE: 84 BPM | OXYGEN SATURATION: 92 % | TEMPERATURE: 98.4 F | RESPIRATION RATE: 18 BRPM | SYSTOLIC BLOOD PRESSURE: 155 MMHG

## 2019-01-01 VITALS
HEART RATE: 76 BPM | DIASTOLIC BLOOD PRESSURE: 84 MMHG | RESPIRATION RATE: 20 BRPM | TEMPERATURE: 98 F | SYSTOLIC BLOOD PRESSURE: 132 MMHG

## 2019-01-01 VITALS — DIASTOLIC BLOOD PRESSURE: 64 MMHG | BODY MASS INDEX: 22.92 KG/M2 | SYSTOLIC BLOOD PRESSURE: 114 MMHG | HEIGHT: 66 IN

## 2019-01-01 DIAGNOSIS — R01.1 SYSTOLIC EJECTION MURMUR: ICD-10-CM

## 2019-01-01 DIAGNOSIS — I47.1 ATRIAL TACHYCARDIA (HCC): Primary | ICD-10-CM

## 2019-01-01 DIAGNOSIS — N39.0 FREQUENT UTI: ICD-10-CM

## 2019-01-01 DIAGNOSIS — Z63.6 CAREGIVER STRESS: ICD-10-CM

## 2019-01-01 DIAGNOSIS — R41.0 DELIRIUM: Primary | ICD-10-CM

## 2019-01-01 DIAGNOSIS — L98.429 SKIN ULCER OF SACRUM, UNSPECIFIED ULCER STAGE (HCC): Primary | ICD-10-CM

## 2019-01-01 DIAGNOSIS — F03.90 DEMENTIA WITHOUT BEHAVIORAL DISTURBANCE, UNSPECIFIED DEMENTIA TYPE: ICD-10-CM

## 2019-01-01 DIAGNOSIS — L89.154 SACRAL DECUBITUS ULCER, STAGE IV (HCC): ICD-10-CM

## 2019-01-01 DIAGNOSIS — R55 NEAR SYNCOPE: Primary | ICD-10-CM

## 2019-01-01 DIAGNOSIS — E46 PROTEIN-CALORIE MALNUTRITION, UNSPECIFIED SEVERITY (HCC): ICD-10-CM

## 2019-01-01 DIAGNOSIS — R35.0 URINARY FREQUENCY: Primary | ICD-10-CM

## 2019-01-01 DIAGNOSIS — I47.1 ATRIAL TACHYCARDIA (HCC): ICD-10-CM

## 2019-01-01 DIAGNOSIS — R55 NEAR SYNCOPE: ICD-10-CM

## 2019-01-01 DIAGNOSIS — F41.9 ANXIETY: ICD-10-CM

## 2019-01-01 DIAGNOSIS — I10 ESSENTIAL HYPERTENSION: Primary | ICD-10-CM

## 2019-01-01 DIAGNOSIS — R55 VASOVAGAL SYNCOPE: Primary | ICD-10-CM

## 2019-01-01 DIAGNOSIS — R41.0 DELIRIUM: ICD-10-CM

## 2019-01-01 DIAGNOSIS — F05 DELIRIUM DUE TO GENERAL MEDICAL CONDITION: Primary | ICD-10-CM

## 2019-01-01 DIAGNOSIS — S00.83XA CONTUSION OF FACE, INITIAL ENCOUNTER: ICD-10-CM

## 2019-01-01 DIAGNOSIS — E11.21 TYPE 2 DIABETES WITH NEPHROPATHY (HCC): ICD-10-CM

## 2019-01-01 DIAGNOSIS — Z66 DNR NO CODE (DO NOT RESUSCITATE): ICD-10-CM

## 2019-01-01 DIAGNOSIS — Z91.81 AT HIGH RISK FOR FALLS: ICD-10-CM

## 2019-01-01 DIAGNOSIS — F03.91 DEMENTIA WITH BEHAVIORAL DISTURBANCE, UNSPECIFIED DEMENTIA TYPE: Primary | ICD-10-CM

## 2019-01-01 DIAGNOSIS — I10 ESSENTIAL HYPERTENSION: ICD-10-CM

## 2019-01-01 DIAGNOSIS — Z51.5 PALLIATIVE CARE BY SPECIALIST: ICD-10-CM

## 2019-01-01 DIAGNOSIS — F41.9 ANXIETY: Primary | ICD-10-CM

## 2019-01-01 LAB
ALBUMIN SERPL-MCNC: 3.1 G/DL (ref 3.5–5)
ALBUMIN/GLOB SERPL: 0.8 {RATIO} (ref 1.1–2.2)
ALP SERPL-CCNC: 120 U/L (ref 45–117)
ALT SERPL-CCNC: 10 U/L (ref 12–78)
ANION GAP SERPL CALC-SCNC: 7 MMOL/L (ref 5–15)
APPEARANCE UR: ABNORMAL
APPEARANCE UR: CLEAR
AST SERPL-CCNC: 14 U/L (ref 15–37)
ATRIAL RATE: 67 BPM
BACTERIA #/AREA URNS HPF: ABNORMAL /[HPF]
BACTERIA UR CULT: ABNORMAL
BACTERIA URNS QL MICRO: NEGATIVE /HPF
BASOPHILS # BLD: 0.1 K/UL (ref 0–0.1)
BASOPHILS NFR BLD: 1 % (ref 0–1)
BILIRUB SERPL-MCNC: 0.7 MG/DL (ref 0.2–1)
BILIRUB UR QL CFM: NEGATIVE
BILIRUB UR QL STRIP: NEGATIVE
BUN SERPL-MCNC: 16 MG/DL (ref 6–20)
BUN/CREAT SERPL: 21 (ref 12–20)
CALCIUM SERPL-MCNC: 10.2 MG/DL (ref 8.5–10.1)
CALCULATED P AXIS, ECG09: 58 DEGREES
CALCULATED R AXIS, ECG10: -53 DEGREES
CALCULATED T AXIS, ECG11: 37 DEGREES
CASTS URNS QL MICRO: ABNORMAL /LPF
CHLORIDE SERPL-SCNC: 108 MMOL/L (ref 97–108)
CO2 SERPL-SCNC: 27 MMOL/L (ref 21–32)
COLOR UR: ABNORMAL
COLOR UR: YELLOW
COMMENT, HOLDF: NORMAL
CREAT SERPL-MCNC: 0.78 MG/DL (ref 0.55–1.02)
DIAGNOSIS, 93000: NORMAL
DIFFERENTIAL METHOD BLD: ABNORMAL
EOSINOPHIL # BLD: 0.1 K/UL (ref 0–0.4)
EOSINOPHIL NFR BLD: 1 % (ref 0–7)
EPI CELLS #/AREA URNS HPF: ABNORMAL /HPF
EPITH CASTS URNS QL MICRO: ABNORMAL /LPF
ERYTHROCYTE [DISTWIDTH] IN BLOOD BY AUTOMATED COUNT: 13.9 % (ref 11.5–14.5)
GLOBULIN SER CALC-MCNC: 4 G/DL (ref 2–4)
GLUCOSE SERPL-MCNC: 162 MG/DL (ref 65–100)
GLUCOSE UR QL: NEGATIVE
GLUCOSE UR STRIP.AUTO-MCNC: NEGATIVE MG/DL
HCT VFR BLD AUTO: 38.8 % (ref 35–47)
HGB BLD-MCNC: 12.1 G/DL (ref 11.5–16)
HGB UR QL STRIP: ABNORMAL
HGB UR QL STRIP: NEGATIVE
IMM GRANULOCYTES # BLD AUTO: 0.1 K/UL (ref 0–0.04)
IMM GRANULOCYTES NFR BLD AUTO: 1 % (ref 0–0.5)
KETONES UR QL STRIP.AUTO: 40 MG/DL
KETONES UR QL STRIP: NEGATIVE
LEUKOCYTE ESTERASE UR QL STRIP.AUTO: ABNORMAL
LEUKOCYTE ESTERASE UR QL STRIP: ABNORMAL
LYMPHOCYTES # BLD: 1.3 K/UL (ref 0.8–3.5)
LYMPHOCYTES NFR BLD: 10 % (ref 12–49)
MCH RBC QN AUTO: 29 PG (ref 26–34)
MCHC RBC AUTO-ENTMCNC: 31.2 G/DL (ref 30–36.5)
MCV RBC AUTO: 93 FL (ref 80–99)
MICRO URNS: ABNORMAL
MONOCYTES # BLD: 0.8 K/UL (ref 0–1)
MONOCYTES NFR BLD: 6 % (ref 5–13)
MUCOUS THREADS URNS QL MICRO: PRESENT
NEUTS SEG # BLD: 10.7 K/UL (ref 1.8–8)
NEUTS SEG NFR BLD: 81 % (ref 32–75)
NITRITE UR QL STRIP.AUTO: NEGATIVE
NITRITE UR QL STRIP: NEGATIVE
NRBC # BLD: 0 K/UL (ref 0–0.01)
NRBC BLD-RTO: 0 PER 100 WBC
P-R INTERVAL, ECG05: 212 MS
PH UR STRIP: 5.5 [PH] (ref 5–8)
PH UR STRIP: 7 [PH] (ref 5–7.5)
PLATELET # BLD AUTO: 187 K/UL (ref 150–400)
PMV BLD AUTO: 11.9 FL (ref 8.9–12.9)
POTASSIUM SERPL-SCNC: 3.1 MMOL/L (ref 3.5–5.1)
PROT SERPL-MCNC: 7.1 G/DL (ref 6.4–8.2)
PROT UR QL STRIP: NEGATIVE
PROT UR STRIP-MCNC: 100 MG/DL
Q-T INTERVAL, ECG07: 466 MS
QRS DURATION, ECG06: 156 MS
QTC CALCULATION (BEZET), ECG08: 492 MS
RBC # BLD AUTO: 4.17 M/UL (ref 3.8–5.2)
RBC #/AREA URNS HPF: ABNORMAL /HPF
RBC #/AREA URNS HPF: ABNORMAL /HPF (ref 0–5)
SAMPLES BEING HELD,HOLD: NORMAL
SODIUM SERPL-SCNC: 142 MMOL/L (ref 136–145)
SP GR UR REFRACTOMETRY: 1.02 (ref 1–1.03)
SP GR UR: 1.01 (ref 1–1.03)
TROPONIN I SERPL-MCNC: <0.05 NG/ML
URINALYSIS REFLEX, 377202: ABNORMAL
UROBILINOGEN UR QL STRIP.AUTO: 1 EU/DL (ref 0.2–1)
UROBILINOGEN UR STRIP-MCNC: 0.2 MG/DL (ref 0.2–1)
VENTRICULAR RATE, ECG03: 67 BPM
WBC # BLD AUTO: 13.1 K/UL (ref 3.6–11)
WBC #/AREA URNS HPF: >30 /HPF
WBC URNS QL MICRO: ABNORMAL /HPF (ref 0–4)

## 2019-01-01 PROCEDURE — 87088 URINE BACTERIA CULTURE: CPT

## 2019-01-01 PROCEDURE — T4541 LARGE DISPOSABLE UNDERPAD: HCPCS

## 2019-01-01 PROCEDURE — T4522 ADULT SIZE BRIEF/DIAPER MED: HCPCS

## 2019-01-01 PROCEDURE — G0299 HHS/HOSPICE OF RN EA 15 MIN: HCPCS

## 2019-01-01 PROCEDURE — A6212 FOAM DRG <=16 SQ IN W/BORDER: HCPCS

## 2019-01-01 PROCEDURE — 3331090004 HSPC SERVICE INTENSITY ADD-ON

## 2019-01-01 PROCEDURE — 3331090001 HH PPS REVENUE CREDIT

## 2019-01-01 PROCEDURE — 87086 URINE CULTURE/COLONY COUNT: CPT

## 2019-01-01 PROCEDURE — 71045 X-RAY EXAM CHEST 1 VIEW: CPT

## 2019-01-01 PROCEDURE — G0300 HHS/HOSPICE OF LPN EA 15 MIN: HCPCS

## 2019-01-01 PROCEDURE — 77030011943

## 2019-01-01 PROCEDURE — A9270 NON-COVERED ITEM OR SERVICE: HCPCS

## 2019-01-01 PROCEDURE — 84484 ASSAY OF TROPONIN QUANT: CPT

## 2019-01-01 PROCEDURE — 0651 HSPC ROUTINE HOME CARE

## 2019-01-01 PROCEDURE — A6252 ABSORPT DRG >16 <=48 W/O BDR: HCPCS

## 2019-01-01 PROCEDURE — A4927 NON-STERILE GLOVES: HCPCS

## 2019-01-01 PROCEDURE — 87186 SC STD MICRODIL/AGAR DIL: CPT

## 2019-01-01 PROCEDURE — HOSPICE MEDICATION HC HH HOSPICE MEDICATION

## 2019-01-01 PROCEDURE — 36415 COLL VENOUS BLD VENIPUNCTURE: CPT

## 2019-01-01 PROCEDURE — 3331090002 HH PPS REVENUE DEBIT

## 2019-01-01 PROCEDURE — 99285 EMERGENCY DEPT VISIT HI MDM: CPT

## 2019-01-01 PROCEDURE — A6216 NON-STERILE GAUZE<=16 SQ IN: HCPCS

## 2019-01-01 PROCEDURE — HHS10554 SHAMPOO/BODY WASH 8 OZ ALOE VESTA

## 2019-01-01 PROCEDURE — 3331090003 HH PPS REVENUE ADJ

## 2019-01-01 PROCEDURE — A6250 SKIN SEAL PROTECT MOISTURIZR: HCPCS

## 2019-01-01 PROCEDURE — 87077 CULTURE AEROBIC IDENTIFY: CPT

## 2019-01-01 PROCEDURE — A6260 WOUND CLEANSER ANY TYPE/SIZE: HCPCS

## 2019-01-01 PROCEDURE — 80053 COMPREHEN METABOLIC PANEL: CPT

## 2019-01-01 PROCEDURE — E0191 PROTECTOR HEEL OR ELBOW: HCPCS

## 2019-01-01 PROCEDURE — 81001 URINALYSIS AUTO W/SCOPE: CPT

## 2019-01-01 PROCEDURE — A4216 STERILE WATER/SALINE, 10 ML: HCPCS

## 2019-01-01 PROCEDURE — 93005 ELECTROCARDIOGRAM TRACING: CPT

## 2019-01-01 PROCEDURE — A4452 WATERPROOF TAPE: HCPCS

## 2019-01-01 PROCEDURE — 70450 CT HEAD/BRAIN W/O DYE: CPT

## 2019-01-01 PROCEDURE — 400013 HH SOC

## 2019-01-01 PROCEDURE — 3336500001 HSPC ELECTION

## 2019-01-01 PROCEDURE — 85025 COMPLETE CBC W/AUTO DIFF WBC: CPT

## 2019-01-01 RX ORDER — CIPROFLOXACIN 250 MG/1
250 TABLET, FILM COATED ORAL 2 TIMES DAILY
Qty: 14 TAB | Refills: 0 | Status: SHIPPED | OUTPATIENT
Start: 2019-01-01 | End: 2019-01-01 | Stop reason: SDUPTHER

## 2019-01-01 RX ORDER — ASPIRIN 81 MG/1
81 TABLET ORAL DAILY
Qty: 90 TAB | Refills: 0 | COMMUNITY
Start: 2019-01-01 | End: 2019-01-01 | Stop reason: ALTCHOICE

## 2019-01-01 RX ORDER — NITROFURANTOIN 25; 75 MG/1; MG/1
100 CAPSULE ORAL DAILY
Qty: 90 CAP | Refills: 1 | Status: SHIPPED | OUTPATIENT
Start: 2019-01-01 | End: 2019-01-01

## 2019-01-01 RX ORDER — MELATONIN
1000 DAILY
Qty: 90 TAB | Refills: 0 | COMMUNITY
Start: 2019-01-01 | End: 2019-01-01 | Stop reason: ALTCHOICE

## 2019-01-01 RX ORDER — MUPIROCIN 20 MG/G
OINTMENT TOPICAL DAILY
Qty: 22 G | Refills: 0 | Status: SHIPPED | OUTPATIENT
Start: 2019-01-01 | End: 2019-01-01

## 2019-01-01 RX ORDER — TETRACYCLINE HYDROCHLORIDE 250 MG/1
250 CAPSULE ORAL
Qty: 21 CAP | Refills: 0 | Status: SHIPPED | OUTPATIENT
Start: 2019-01-01 | End: 2019-01-01

## 2019-01-01 RX ORDER — ALPRAZOLAM 0.25 MG/1
0.12 TABLET ORAL
Qty: 30 TAB | Refills: 0 | Status: SHIPPED | OUTPATIENT
Start: 2019-01-01 | End: 2019-01-01

## 2019-01-01 RX ORDER — CIPROFLOXACIN 250 MG/1
250 TABLET, FILM COATED ORAL 2 TIMES DAILY
Qty: 14 TAB | Refills: 0 | Status: SHIPPED | OUTPATIENT
Start: 2019-01-01 | End: 2019-01-01 | Stop reason: ALTCHOICE

## 2019-01-01 SDOH — SOCIAL STABILITY - SOCIAL INSECURITY: DEPENDENT RELATIVE NEEDING CARE AT HOME: Z63.6

## 2019-01-30 NOTE — TELEPHONE ENCOUNTER
Orders Placed This Encounter    ciprofloxacin HCl (CIPRO) 250 mg tablet     Sig: Take 1 Tab by mouth two (2) times a day. Dispense:  14 Tab     Refill:  0     The above orders were approved via [facility] per Dr. Lilian Avelar, III.

## 2019-01-30 NOTE — TELEPHONE ENCOUNTER
Bailey Sin [facility]) [phone] (M)     pt's daughter calling to says that they tested her for a uti and it was positive. She used test strip at home. If he could send  something in to [place] for her today.

## 2019-02-13 NOTE — TELEPHONE ENCOUNTER
Spoke with pt daughter Cheree Favre who is on hipaa. 2 pt identifiers. Explained that we need to change her abx. Cipro was resistant on sensitivities. Per SRJ, will change to Tetracycline 250 mg TID x 7 days. She is to stop the Cipro. Rx sent to pharm. Verbalized understanding. Orders Placed This Encounter    tetracycline (ACHROMYCIN, SUMYCIN) 250 mg capsule     Sig: Take 1 Cap by mouth Before breakfast, lunch, and dinner for 7 days. Dispense:  21 Cap     Refill:  0     The above orders were approved via [facility] per Dr. Bre Hanna, III.

## 2019-02-13 NOTE — TELEPHONE ENCOUNTER
----- Message from Emmy Weiner MD sent at 2/13/2019 11:28 AM EST -----  Stop the cipro. Tetracycline 250 TID for 7 days.

## 2019-03-18 NOTE — TELEPHONE ENCOUNTER
Spoke with pt daughter Gelacio Rosas who is on hipaa. 2 pt identifiers. Advised that per SRJ, will treat pt with Cipro 250 mg BID x  7 days for recurrent UTI. Rx sent to pharmacy. Red flags reviewed. Verbalized understanding. Orders Placed This Encounter    ciprofloxacin HCl (CIPRO) 250 mg tablet     Sig: Take 1 Tab by mouth two (2) times a day. PLEASE DELIVER     Dispense:  14 Tab     Refill:  0     The above orders were approved via [facility] per Dr. Noreen Gay, III.

## 2019-03-18 NOTE — TELEPHONE ENCOUNTER
1) Daughter calling about Mother. Pt has another UTI-  Please advise what to do with the AZO test stip. 2)  Wants to know if you got the package from Daughter.     Caridad Roche, daughter - [phone]

## 2019-04-16 NOTE — ED TRIAGE NOTES
Pt arrives vis EMS from [facility] after she hit her head on the wall. PT has a lac on her LEFT eyebrow. PT is legally blind, and [place]. Family states she is altered from baseline. B.S. En route was 126 and in afib.

## 2019-04-16 NOTE — ED PROVIDER NOTES
80 y.o. female with past medical history significant for Hypertension, Diabetes, Glaucoma, Hypercholesterolemia, Aortic stenosis, and Delirium who presents via EMS from [facility] with chief complaint of AMS. Per caregiver, the patient was using the restroom when she had a syncopal episode while on the toilet causing her to fall forward into the towel rack. Per caregiver, patient hit her head on the towel rack resulting in immediate onset of small laceration to the left eyebrow. Per EMS, en route to [address] [place] [address] ED the patient's blood sugar was 126 and was in a.fib. Patient presents to [address] [place] [address] ED with AMS and speaking repetitive phrases, with small laceration to the left lateral eyebrow with no active bleeding. Per caregiver, the patient has baseline AMS but states the patient is more confused than normal today. Per EMS, the patient was diagnosed with UTI on 04/11/2019 and treated with Nitrofurantoin. PCP: Zen Zamora MD 
 
Note written by Marco Samuel, as dictated by Sai Prieto MD 7:12 AM 
 
Full history, physical exam, and ROS unable to be obtained due to:  dementia. The history is provided by the patient, the EMS personnel and a caregiver. The history is limited by the condition of the patient. Past Medical History:  
Diagnosis Date  Aortic stenosis  Atrial tachycardia ([address]  Delirium 11/19/2018  Diabetes ([address]  Ejection fraction < 50% 5/9/2016  Foot drop, left  Glaucoma  Hypercholesterolemia  Hypertension Past Surgical History:  
Procedure Laterality Date  HX APPENDECTOMY  1943  HX BREAST BIOPSY    
 lumpectomy of left breast-benign 1980's  HX CATARACT REMOVAL Bilateral  
 HX GYN    
 ruptured ovary 1943 [facility]  '96 KNEE; broke left knee  HX ORTHOPAEDIC    
 BACK; disc removal  
 
   
Family History:  
Problem Relation Age of Onset  Cancer Father Social History Socioeconomic History  Marital status:  Spouse name: Not on file  Number of children: Not on file  Years of education: Not on file  Highest education level: Not on file Occupational History  Not on file Social Needs  Financial resource strain: Not on file  Food insecurity:  
  Worry: Not on file Inability: Not on file  Transportation needs:  
  Medical: Not on file Non-medical: Not on file Tobacco Use  Smoking status: Never Smoker  Smokeless tobacco: Never Used Substance and Sexual Activity  Alcohol use: No  
 Drug use: No  
 Sexual activity: Not Currently Lifestyle  Physical activity:  
  Days per week: Not on file Minutes per session: Not on file  Stress: Not on file Relationships  Social connections:  
  Talks on phone: Not on file Gets together: Not on file Attends Catholic service: Not on file Active member of club or organization: Not on file Attends meetings of clubs or organizations: Not on file Relationship status: Not on file  Intimate partner violence:  
  Fear of current or ex partner: Not on file Emotionally abused: Not on file Physically abused: Not on file Forced sexual activity: Not on file Other Topics Concern  Not on file Social History Narrative  Not on file ALLERGIES: Timolol Review of Systems Unable to perform ROS: Dementia Skin: Positive for wound. Vitals:  
 04/16/19 4369 BP: (!) 148/132 Pulse: (!) 52 Resp: 18 Temp: 98.3 °F (36.8 °C) SpO2: 98% Physical Exam  
Constitutional: She appears well-developed and well-nourished. No distress. Following commands, talking HENT:  
Head:  
 
 
Nose: Nose normal.  
Mouth/Throat: Oropharynx is clear and moist.  
Eyes: Pupils are equal, round, and reactive to light. Conjunctivae and EOM are normal. No scleral icterus. Neck: Normal range of motion. Neck supple. No JVD present.  No tracheal deviation present. No thyromegaly present. No carotid bruits noted. Cardiovascular: Normal rate, regular rhythm, normal heart sounds and intact distal pulses. Exam reveals no gallop and no friction rub. No murmur heard. Pulmonary/Chest: Effort normal and breath sounds normal. No respiratory distress. She has no wheezes. She has no rales. She exhibits no tenderness. Abdominal: Soft. Bowel sounds are normal. She exhibits no distension and no mass. There is no tenderness. There is no rebound and no guarding. Musculoskeletal: Normal range of motion. She exhibits no tenderness. Trace edema bilateral lower extremities Lymphadenopathy:  
  She has no cervical adenopathy. Neurological: She is alert. She has normal reflexes. No cranial nerve deficit. Coordination normal.  
Skin: Skin is warm and dry. No rash noted. No erythema. Psychiatric: She has a normal mood and affect. Her behavior is normal. Judgment and thought content normal.  
Nursing note and vitals reviewed. Note written by Marco Serrano, as dictated by John Mae MD 7:12 AM 
  
 
MDM Number of Diagnoses or Management Options Vasovagal syncope: new and requires workup Amount and/or Complexity of Data Reviewed Clinical lab tests: ordered and reviewed Tests in the radiology section of CPT®: ordered and reviewed Decide to obtain previous medical records or to obtain history from someone other than the patient: yes Obtain history from someone other than the patient: yes Review and summarize past medical records: yes Discuss the patient with other providers: yes Independent visualization of images, tracings, or specimens: yes Risk of Complications, Morbidity, and/or Mortality Presenting problems: high Diagnostic procedures: high Management options: high Patient Progress Patient progress: stable Procedures PROGRESS NOTE: 
9:27 AM Still awaiting labs. ED EKG interpretation: Rhythm: sinus rhythm with 1st degree AV block; and regular . Rate (approx.): 67 bpm; Axis: left axis deviation; ST/T wave: Nonspecific ST and T wave changes. Occasional PACs. Note written by Marco Anders, as dictated by Jose Manuel Graham MD 9:31 AM 
 
PROGRESS NOTE: 
10:08 AM Patient's CXR and head CT unremarkable, CMP still pending. CONSULT NOTE 11:31 AM: 
Jose Manuel Graham MD spoke with Faby Royal MD Discussed patient's presentation, history, physical assessment, and available diagnostic results. Dr. Princess Uriarte agrees with discharge home.

## 2019-04-16 NOTE — PROGRESS NOTES
Care Management - Received call from nursing to assist with transportation home for patient. Met with patient's daughter and caregiver/owner of [employer] ([phone]) in the room. Patient was asleep. Patient lives at [facility] and [facility]. Patient is usually able to get up to her wheelchair. She typically travels via [employer] that daughter arranges. Patient does not have her wheelchair with her at this time. Daughter agreeable to paying for [employer]. Called [facility] ([phone]) and spoke with Ryan Odom. He said they can  patient as soon as the  finishes the current patient he is transporting. Updated patient's nurse and family. Date of previous inpatient admission/ ED visit? What brought the patient back to ED? Did patient decline recommended services during last admission/ ED visit (if yes, what)? No. Last ED visit was 7-27-19 for fall. Has patient seen a provider since their last inpatient admission/ED visit (if yes, when)? yes CM Interventions:  
From previous inpatient admission/ED visit: none From current inpatient admission/ED visit: Arranged private pay wheelchair [facility]isa [facility] through [employer]. Care Management Interventions PCP Verified by CM: Yes(Dr. Minerva Mireles) Last Visit to PCP: 11/19/18 Mode of Transport at Discharge: (Wheelchair [facility]) Discharge Durable Medical Equipment: No 
Physical Therapy Consult: No 
Occupational Therapy Consult: No 
Speech Therapy Consult: No 
Current Support Network: Lives Alone(Lives at [place] [address] with 24 hour caregivers. ) Confirm Follow Up Transport: (wheelchair van-[facility]) Plan discussed with Pt/Family/Caregiver: Yes Freedom of Choice Offered: Yes [employer] Information Provided?: No 
Discharge Location Discharge Placement: Home [facility]

## 2019-04-17 NOTE — PROGRESS NOTES
Physical Therapy Screening: 
Physical Therapy consult is not indicated at this time. A screening was performed on this patient's chart based on their entrance into the emergency department and potential need for physical therapy identified. The patients chart was reviewed and the patient was interviewed/screened. A physical therapy consult is not indicated at this time based on their prior level of function or current medical status. Please consult physical therapy if any therapy needs arise. Thank you. Met with patient and her daughter. Patient resides at [facility] with 24/7 caregiver assistance.   Daughter voiced no concerns with patient returning to the facility/

## 2019-05-10 PROBLEM — R41.0 DELIRIUM: Status: RESOLVED | Noted: 2018-01-01 | Resolved: 2019-01-01

## 2019-05-10 NOTE — PATIENT INSTRUCTIONS

## 2019-05-11 NOTE — PROGRESS NOTES
HPI: 
Janice Howard is a 80y.o. year old female who is here for a routine visit: 
 
Presents for follow-up visit. She has recently had an emergency room visit after a fall where she struck her head. She had a CT scan of the head that was normal.  Apparently she has had a second episode where she became unconscious on the toilet and had 3 episodes where her caregivers had to arouse her. On at least one episode she had some shaking in her extremities. She has had some difficulty with questionable left facial droop. She has been perseverating more since her boyfriend recently . She continues to say repetitive things over and over again. Her daughter is concerned that she might of had a mini stroke. Her appetite is been somewhat decreased. She is been eating a limited diet. We cannot weigh her today due to her inability to stand. When you are able to get her to understand the words that you speak she answers appropriately. Otherwise she is continuously perseverating with unintelligible words. No nausea or vomiting. Bowel movements have been daily. No change in bladder habits. She is recently been put on suppressive antibiotics for UTI. She does not want to go back to the emergency room and her daughter does not want her aggressively treated for any additional problems. Past Medical History:  
Diagnosis Date  Aortic stenosis  Atrial tachycardia ([address]  Delirium 2018  Diabetes ([facility]  Ejection fraction < 50% 2016  Foot drop, left  Glaucoma  Hypercholesterolemia  Hypertension Past Surgical History:  
Procedure Laterality Date  HX APPENDECTOMY    HX BREAST BIOPSY    
 lumpectomy of left breast-benign   HX CATARACT REMOVAL Bilateral  
 HX GYN    
 ruptured ovary  [facility]  ' KNEE; broke left knee  HX ORTHOPAEDIC    
 BACK; disc removal  
 
 
Prior to Admission medications Medication Sig Start Date End Date Taking? Authorizing Provider  
nitrofurantoin, macrocrystal-monohydrate, (MACROBID) 100 mg capsule Take 1 Cap by mouth daily. 5/3/19  Yes Timmy Blair MD  
aspirin delayed-release 81 mg tablet Take 1 Tab by mouth daily. 2/18/19  Yes   
cholecalciferol (VITAMIN D3) 1,000 unit tablet Take 1 Tab by mouth daily. 2/18/19  Yes ALPRAZolam (XANAX) 0.25 mg tablet Take 0.5 Tabs by mouth three (3) times daily as needed for Anxiety. Max Daily Amount: 0.375 mg. 2/18/19  Yes Timmy Blair MD  
brinzolamide (AZOPT) 1 % ophthalmic suspension Administer 1 Drop to left eye two (2) times a day. Yes Provider, Historical  
latanoprost (XALATAN) 0.005 % ophthalmic solution Administer 1 Drop to left eye nightly. Yes Provider, Historical  
brimonidine (ALPHAGAN P) 0.1 % ophthalmic solution Administer 1 Drop to left eye three (3) times daily. Provider, Historical  
 
 
Social History Socioeconomic History  Marital status:  Spouse name: Not on file  Number of children: Not on file  Years of education: Not on file  Highest education level: Not on file Occupational History  Not on file Social Needs  Financial resource strain: Not on file  Food insecurity:  
  Worry: Not on file Inability: Not on file  Transportation needs:  
  Medical: Not on file Non-medical: Not on file Tobacco Use  Smoking status: Never Smoker  Smokeless tobacco: Never Used Substance and Sexual Activity  Alcohol use: No  
 Drug use: No  
 Sexual activity: Not Currently Lifestyle  Physical activity:  
  Days per week: Not on file Minutes per session: Not on file  Stress: Not on file Relationships  Social connections:  
  Talks on phone: Not on file Gets together: Not on file Attends Evangelical service: Not on file Active member of club or organization: Not on file Attends meetings of clubs or organizations: Not on file Relationship status: Not on file  Intimate partner violence:  
  Fear of current or ex partner: Not on file Emotionally abused: Not on file Physically abused: Not on file Forced sexual activity: Not on file Other Topics Concern  Not on file Social History Narrative  Not on file ROS Per HPI Visit Vitals /64 Pulse (P) 71 Temp (P) 98 °F (36.7 °C) (Oral) Resp (P) 18 Ht 5' 6\" (1.676 m) SpO2 (P) 98% BMI 22.92 kg/m² Physical Exam  
Physical Examination: General appearance - alert, well appearing, and in no distress Chest - clear to auscultation, no wheezes, rales or rhonchi, symmetric air entry Heart - normal rate, regular rhythm, normal S1, S2, no murmurs, rubs, clicks or gallops Abdomen - soft, nontender, nondistended, no masses or organomegaly Extremities - peripheral pulses normal, no pedal edema, no clubbing or cyanosis Constantly perseverating words in the office. Some questionable left facial droop. Otherwise grossly neurologically intact. Assessment/Plan: 
Diagnoses and all orders for this visit: 1. Near syncope-#related to low blood pressure. At this point will continue her aspirin and force fluids. We will not pursue aggressive evaluation given her age. 2. DNR no code (do not resuscitate)-discussed hospice evaluation versus palliative care. At this point they would like to pursue palliative care and home-based primary care and will do so. -     DO NOT RESUSCITATE 3. Dementia without behavioral disturbance, unspecified dementia type-likely the cause for her perseveration. Will not treat for now. 4. Anxiety-stable with as needed meds. 5. Frequent UTI-continue suppressive therapy for now. 6. Type 2 diabetes with nephropathy (HCC)-diet controlled. 7. Essential hypertension-blood pressure well controlled off meds. 8. Atrial tachycardia (HCC)-stable with no medications. Follow-up and Dispositions · Return if symptoms worsen or fail to improve. Advised her to call back or return to office if symptoms worsen/change/persist. 
Discussed expected course/resolution/complications of diagnosis in detail with patient. Medication risks/benefits/costs/interactions/alternatives discussed with patient. She was given an after visit summary which includes diagnoses, current medications, & vitals. She expressed understanding with the diagnosis and plan.

## 2019-05-16 NOTE — TELEPHONE ENCOUNTER
Kvng Cedeno , daughter -[phone]  Wants to know when the Palliative care would begin for her Mother. They do have a copy of DNR at [facility] Advise.

## 2019-05-20 NOTE — TELEPHONE ENCOUNTER
Orders Placed This Encounter    REFERRAL TO PALLIATIVE MEDICINE Please contact daughter Tayler Dickens [phone] to set up Please eval. Pt does not wish to have any additional aggressive therapy or ED visits. Has had recent decline. Referral Priority:   Routine     Referral Type:   Consultation     Referral Reason:   Specialty Services Required     Referred to Provider:    Salvatore Matos MD     Requested Specialty:   Palliative Medicine     Number of Visits Requested:   1

## 2019-05-24 NOTE — TELEPHONE ENCOUNTER
[facility] Palliative Medicine Office  Nursing Note  [phone]  Fax [phone]     Name:  Fabiano Brady  YOB: 1919     Incoming call from pt's daughter Amado Farfan requesting information about Palliative Home for Irvin Meza. Dr. Carlos Johnston referred Ms. Monique to Palliative Home. Pt has history of dementia. She does not wish to have any additional aggressive therapy or ED visits. She has had recent decline. Dtr states that pt [facility] with hired caregivers. In 2019, pt's 8 year old \"boyfriend\"  and since then pt has been declining. She has been less mobile and has gotten weaker. Pt spends all day in the recliner and now she has a sacral pressure ulcer. Dtr says she called [facility] today and they saw pt, cleaned her wound, and told dtr wound care would need to be done daily. The provider told pt she will need to lay in bed on her side to promote wound healing. Dtr thought that [address] would provide nurses to come daily to do the wound care. Nurse explained that we can schedule pt for a Palliative Home visit next week (Dr. Sharyle Marvel has openings on Thursday May 30), but that we do not have nurses that come out to do wound care. Home health could be ordered for wound care. Daughter says she would like an order for home health. Nurse called Dr. Bisi Priest' office and left a message with Dr. Mele Hope' nurse that [address] can make a visit on May 30 and in the meantime, requested that home health be ordered for pt's wound care. Nurse called pt's dtr back and informed her that request has been made for Dr. Carlos Johnston to order home health.     Theodora Fuentes, HEATHERN, RN  Clinical Referral Navigator

## 2019-05-24 NOTE — TELEPHONE ENCOUNTER
Sharlet Rubinstein with [facility] [employer]       Please call with an order home health and wound care

## 2019-05-25 NOTE — TELEPHONE ENCOUNTER
Received call from Claude Tran daughter stating [address] was supposed to be set up for an unstagable ulcer that was evaluated by [facility] 5/24/19. Mom Francheska Franco lives at [facility], independent living. She has 24hrs caregiver. They do not do wound care. Called Palliative Care and left message but via note appears they will not see her till 5/30/19. Placed ordered for [facility] in system. Call [address] [place] [address] [place] and spoke to nurse on call Rodrigo Hammonds. She will not be able to see Francheska Franco today without her being evaluated by intake. Left information for the intake nurse to call me on my cell/ office number. Informed daughter Ms. Piña that [facility] will take another 24hrs. Call [facility] since they recommended Francheska Franco needs to be seen today again.  should call Ms. Peri Gates as part of the intake. Gave her the number to call if not done.

## 2019-05-26 NOTE — TELEPHONE ENCOUNTER
Spoke to intake nurse from [facility], Kylah Zuniga, and provided additional details regarding the wound as given to me by Papa Johnson, [facility]. Per Ruby Chadd has large 10cm x 14cm unstagable necrotic sacral decubitus. No e/o infection. Hong Mane hemodynamically stable. He placed hydroform dressing and showed patient's aids  what to do also. He also asked that a hospital bed be ordered for Hong Mane   I conveyed this information to Ms. Flavia Mena. The wound nurse will call with recommended orders. Goal for Hong Mane to be seen by [facility] 05/26/19  Or latest 5/27/19. Ms. Flavia Mena will notify DANIEL Taveras. Will notify Dr. Pj Kennedy and team to start orders for hospital bed as this requires ancillary support that is available during the week.

## 2019-05-28 NOTE — TELEPHONE ENCOUNTER
giselle with [facility] [phone]     Calling to report a stage 2 pressure ulcer to right shoulder and would like ok to monitor and apply foam border dressing.

## 2019-05-29 NOTE — TELEPHONE ENCOUNTER
Unable to contact Madalyn. MORRIS left on personal cell that per jazmin from [address] that it is okay for additional wound care for the Stage 2 pressure ulcer on right shoulder.

## 2019-05-30 NOTE — PROGRESS NOTES
[address]   Psychosocial Assessment  [phone]      Reason for Assessment:    Initial Palliative Home visit     Sources of Information:    Present for visit: Daughters Leticia Pascual and Jacques Sy Planning:      Primary Decision Maker: Shaq Coffey - Child - [phone]    Secondary Decision Maker: Brandi Coleman - [phone]  Advance Care Planning 5/31/2019   Patient's Healthcare Decision Maker is: Legal Next of Kin   Primary Decision Maker Name -   Primary Decision Maker Relationship to Patient -   Secondary Decision Maker Name -   Confirm Advance Directive None   Patient Would Like to Complete Advance Directive Unable       Emergency Action Plan:  [x]During today's visit, SW reviewed the Emergency Action Plan with patient and family. SW completed the Emergency Numbers, reviewed the content areas of Power Loss, Natural Disasters, Clinical Emergencies, Severe Weather, Safety in the Home, and Infection Control. Patient assessed to have an acuity value of 1 as evidenced by 24 hour care presence.     Mental Status:    []Alert  []Lethargic   [x]Unresponsive  Oriented to:  []Person  []Place  []Time  []Situation      Barriers to Learning:    []Language  []Developmental  [x]Cognitive  []Altered Mental Status  []Forgetfulness []Visual/Hearing Impairment  []Unable to Read/Write  []Motivational   []No Barriers Identified  []Other:    Relationship Status:  []Single  []  []Significant Other/Life Partner  []  []  [x]      Financial/Legal Concerns:    []Uninsured  []Limited Income/Resources  []Non-Citizen   []Utility Issues  []Food Insecurity  []Transportation Issues  [x]No Concerns Identified  []Financial POA:    []Other:    Living Circumstances:  []House   [x]Apartment [address] [place] [address]  []Assisted Living Facility/Group Home   Facility:  []Homeless  []Incarcerated    [x]Lives Alone  []Family/Significant Other in Household   List:   []Roommates   List:   [x]Paid Caregivers   List: Several hired caregivers  []Children in the Home - Ages:   [] Issues - Details:       Support System  [x]Strong  []Fair  []Limited    How often do social supports visit? N/A  How do you socialize? N/A  []Community agencies involved Name/Contact:  []History with Adult Protective Services   []History with  or Psychiatrist;  Name(s): If yes, they be contacted by the Palliative Medicine team? []Yes []No     Care Provision Plan in the Event of Patient Decline:  []In place - Details:   []Actively being developed by patient / support system  []Ready to begin the process of creating care provision plans  []Understands the importance of creating care provision plans, but not ready to begin  []Patient and/or support system resisting the creation of care provision plans  [x] Receives help with ADLs   [x] Yes, Who helps you? Hired caregivers   How many hours/days? 24 hours/day   [] No, Do you need help? Environment and Safety  [] Problem with bed bugs, odors, pests, or pets  [x] Working smoke alarm  [] Difficulty getting to exits from the home  []Firearms in the home   Where:  []Emergency Call Device system in place - Which brand?,  []Interested in obtaining and subscribing to an Emergency Call Device system  []Substance Use:   Tobacco? [] Yes [] No    Alcohol? [] Yes, How many drinks per week: [] No   Drugs?  [] Yes, which drugs:   [] No    Yarsanism/Spiritual/Existential:  []Strong Sense of Spirituality  []Involved in [address]  []Request  Visit  []Expressing Dinora Vilchis  [x]No Concerns Identified    Sleep Habits:   []Poor []Unsatisfactory []Satisfactory []Good []Very Good   Specific sleep problems: Non-responsive    Coping with Illness:         Patient: Family/Caregiver:   Understanding and Acceptance of Illness/Prognosis  [] [x]   Strong Sense of Resilience [] [x]   Self Reflection [] [x]   Engaged Support System [] [x]   Does not Readily Discuss Illness [] []   Denial of Terminal Status [] []   Anger [] []   Depression [] []   Anxiety/Fear [] []   Bargaining [] []   Recent Diagnosis/Prognosis [x]  Recent decline since February [x]   Difficulties with Body Image [] []   Loss of Identity [] []   Excessive Substance Use [] []   Mental Health History [] []   Enmeshed Relationships [] []   History of Loss [] []   Anticipatory Grief [] [x]   Concern for Complicated Grief [] []   Suicidal Ideation or Plan [] []   Caregiver Stress [] []   Unable to assess [] []     Narrative:   Ms. Ramesh Hollis is a 81 y/o woman whose PMH includes:  Past Medical History:   Diagnosis Date    Aortic stenosis     [facility]     Delirium 2018    Diabetes ([facility]     Ejection fraction < 50% 2016    Foot drop, left     Glaucoma     Hypercholesterolemia     Hypertension      Today is in bed and not responding to her daughters, although they say she has been talking with them through yesterday. They have hired care with the CNA working to help pt drink water today, but pt barely drinking. Daughters have seen a vast decline over the past month, saying her decline began in February, when her 81 y/o boyfriend . They showed photos of pt on Easter, a month ago, with her sitting and smiling. Today, daughters are wanting to set up comfort care for pt at the recommendation of pt's PCP, Dr. Selvin Jones. Assessment  1. Described Palliative Medicine to pt and family, letting them know Palliative Medicine is an additional layer of care specializing in serious illness for relief of pain, symptoms, stress, is for the patient as well as the family, assists with decisions for Goals of Care, and includes a team approach to improve quality of life for all involved. 2. Reviewed admission papers for Palliative Medicine with daughters agreeing to sign for services.    3. Introduced Palliative Social Work as part of the PM supportive team by providing emotional support, resource referrals, advocacy, assistance with AMDs and counseling. 4. Worked to establish rapport, encouraging expression while actively listening. 5. Dr. Milton Hawk arrived for pt's medical assessment as SW completed the visit. Goals/Plan:   Provide psychosocial services as requested by family.         Length of Visit: 79 minutes    Social Work Plan of Care           POC    New patient assessment of psychosocial needs and social determinants of health  [x]   Ened[address]Noah Limapatricia [address]  [x]    Review Emergency Preparedness Plan   Initiate conversation regarding health care wishes   Assess current Advance Care Planning documents and make ACP recommendations as appropriate   Discuss goals of care and treatment preferences   Engage family in patient health care goals to ensure support for those goals and minimize patient/family conflict  [x]  []      []      []    []    Assess safety concerns and address as appropriate  []    Assess coping strengths for the patient and caregiver   Assess for caregiver burden and intervene as psychosocially indicated  [x]    []      Assess patient for care provision needs to optimize psychosocial function and safety   Assess support system for the patient and caregiver, working to enhance support when needed.  []      [x]    Assist in optimizing levels of psychosocial function  []    Screen for mental health conditions including but not limited to anxiety, depression, and anticipatory grief   Offer counseling services for mental health conditions including but not limited to anxiety, depression, and anticipatory grief   Assess cultural needs of patient/family, educate interdisciplinary team, and engage  []        []      []    Link patient with appropriate community resources  []    Establish therapeutic relationship through in home visits and telephonic touch points  []    SW to remain available for psychosocial support and resources as needed and/or requested  [x]             Frequency and Duration of Social Work Visits   monthly []       Laxmi Calloway, MATI, LMSW, CCM    Palliative   Respecting Choices ® ACP Facilitator  Social Work Supervisee for [address]  [phone]

## 2019-05-30 NOTE — PROGRESS NOTES
Urgent Hospice referral placed, PM nurse spoke with Naif Benz, bernie at [facility]. She is checking on admission nurse availability and will call back.     Danilele Lopez RN  Palliative Medicine

## 2019-05-31 NOTE — PROGRESS NOTES
Palliative Medicine Outpatient Services  [address]: [phone] ([phone]    Patient Name: Johnny Gay  YOB: 1919    Date of Current Visit: 05/31/19  Location of Current Visit:    [] [address] [place] [address] Office  [] [facility] Office  [] [facility] Office  [x] Home  [] Other:      Date of Initial Visit: 5/30/2019  Referral from: PCP  Date of Initial Referral:  Primary Care Physician: Violet Goetz MD      SUMMARY:   Johnny Gay is a 80y.o. year old with a  history of aortic stenosis, congestive heart failure, frailty, hard of hearing, dementia, recent development of sacral decubitus ulcer, who was referred to Palliative Medicine by PCP for management of elderly patient with chronic illnesses at home. The patients social history includes lives with her independent living but needs 24-hour caregivers. Has very involved daughters, grandchildren and great-grandchildren      Initial Referral Intake note from Lisandro Solis RN reviewed prior to visit   PALLIATIVE DIAGNOSES:       ICD-10-CM ICD-9-CM    1. Delirium due to general medical condition F05 293.0    2. Sacral decubitus ulcer, stage IV ([facility]) L89.154 707.03      707.24    3. Protein-calorie malnutrition, unspecified severity ([facility]) E46 263.9    4. Palliative care by specialist Z51.5 V66.7           PLAN:   Patient Instructions     Dear Keyanna Campos and Madai Tipton,    It was nice meeting you both and your mom today. Delirium-she seems to be moaning and we are not sure if she is in pain. Her being very hard of hearing is making it very difficult to communicate. But the fact that she has lost a lot of weight the last few days, declined significantly now being completely bedbound unable to communicate very well may all be an indication during end of life. We had a long conversation today about where she was a few months ago and how much she has declined lately and it is important to keep her comfortable from now on.     Bedsore-her sacral pressure ulcer may never heal because of how weak she is. We just need good wound care at this time. Goals of care-we talked about how we think mom is towards end of life and it is important to keep her comfortable. I really think hospice is the best choice at this point. I have called  [facility] and they will be in to see and initiate services later today. It was a pleasure taking care of your mother today. Please feel free to call our office should you have any questions. GOALS OF CARE / TREATMENT PREFERENCES:   [====Goals of Care====]  GOALS OF CARE:  Patient / health care proxy stated goals: See Patient Instructions / Summary    TREATMENT PREFERENCES:   Code Status:  [] Attempt Resuscitation       [x] Do Not Attempt Resuscitation    Advance Care Planning:  [x] The [facility] Interdisciplinary Team has updated the ACP Navigator with Decision Maker and Patient Capacity      Primary Decision MakerSyeda Blandon Child - [phone]    Secondary Decision Maker: Pauline Alcaal - [phone]  Advance Care Planning 5/31/2019   Patient's Healthcare Decision Maker is: Legal Next of Kin   Primary Decision Maker Name -   Primary Decision Maker Relationship to Patient -   Secondary Decision Maker Name -   Confirm Advance Directive None   Patient Would Like to Complete Advance Directive Unable       Other:  (If patient appropriate for POST, consider using PALLPOST smart phrase here)    The palliative care team has discussed with patient / health care proxy about goals of care / treatment preferences for patient.  [====Goals of Care====]     PRESCRIPTIONS GIVEN:   No orders of the defined types were placed in this encounter. FOLLOW UP:   No future appointments.         PHYSICIANS INVOLVED IN CARE:   Patient Care Team:  Brenda Anderson MD as PCP - General  Kelly Waters MD as PCP - Hospice Attending  Lela Aguilar MD (Ophthalmology)  Darrius Ortiz MD (Cardiology)  Sumanth Will DPM (Podiatry)       HISTORY:   Reviewed patient-completed ESAS and advance care planning form. Reviewed patient record in prescription monitoring program.    CHIEF COMPLAINT: No chief complaint on file. HPI/SUBJECTIVE:    The patient is: [] Verbal / [x] Nonverbal     Patient nonverbal.  Moaning in bed. Appears to be actively dying. Per daughters, she has been declining since her 8-year-old boyfriend passed away in February. She has been eating less and less and engaging less and less with family. But over the last few weeks she has completely declined, bedbound, little to no eating at all. She has had 24-hour caregivers for over a year now. Clinical Pain Assessment (nonverbal scale for nonverbal patients):   [++++ Clinical Pain Assessment++++]  [++++Pain Severity++++]: Pain: 3  [++++Pain Character++++]:  [++++Pain Duration++++]:  [++++Pain Effect++++]:  [++++Pain Factors++++]:  [++++Pain Frequency++++]:  [++++Pain Location++++]:  [++++ Clinical Pain Assessment++++]       FUNCTIONAL ASSESSMENT:     Palliative Performance Scale (PPS):   30       PSYCHOSOCIAL/SPIRITUAL SCREENING:     Any spiritual / Synagogue concerns:  [] Yes /  [x] No    Caregiver Burnout:  [] Yes /  [x] No /  [] No Caregiver Present      Anticipatory grief assessment:   [x] Normal  / [] Maladaptive       ESAS Anxiety:      ESAS Depression:         REVIEW OF SYSTEMS:     The following systems were [x] reviewed / [x] unable to be reviewed  Systems: constitutional, ears/nose/mouth/throat, respiratory, gastrointestinal, genitourinary, musculoskeletal, integumentary, neurologic, psychiatric, endocrine. Positive findings noted below.   Modified ESAS Completed by: provider           Pain: 3           Dyspnea: 0                    PHYSICAL EXAM:     Wt Readings from Last 3 Encounters:   07/27/18 142 lb (64.4 kg)   11/10/16 142 lb (64.4 kg)   05/09/16 136 lb (61.7 kg)     Blood pressure 120/80, pulse 95, temperature 98 °F (36.7 °C), SpO2 91 %.  Last bowel movement: See Nursing Note    Constitutional: Patient appears extremely frail and actively dying  Eyes: pupils equal, anicteric  ENMT: no nasal discharge, moist mucous membranes  Cardiovascular: regular rhythm, distal pulses intact  Respiratory: breathing not labored, symmetric  Gastrointestinal: soft non-tender, +bowel sounds  Musculoskeletal: no deformity, no tenderness to palpation  Skin: warm, dry, 4 x 4 centimeter sacral decubitus ulcer with malodorous greenish discharge  Neurologic: NA     HISTORY:     Past Medical History:   Diagnosis Date    Aortic stenosis     Atrial tachycardia (HCC)     Delirium 11/19/2018    [facility] [address] [facility]     Ejection fraction < 50% 5/9/2016    Foot drop, left     Glaucoma     Hypercholesterolemia     Hypertension       Past Surgical History:   Procedure Laterality Date    HX APPENDECTOMY  1943    HX BREAST BIOPSY      lumpectomy of left breast-benign 1980's    HX CATARACT REMOVAL      Bilateral    HX GYN      ruptured ovary 1943    HX ORTHOPAEDIC  '96    KNEE; broke left knee    HX ORTHOPAEDIC      BACK; disc removal      Family History   Problem Relation Age of Onset    Cancer Father       History reviewed, no pertinent family history. Social History     Tobacco Use    Smoking status: Never Smoker    Smokeless tobacco: Never Used   Substance Use Topics    Alcohol use: No     Allergies   Allergen Reactions    Timolol Hives      Current Outpatient Medications   Medication Sig    morphine (ROXANOL) 100 mg/5 mL (20 mg/mL) concentrated solution Take 5 mg by mouth every one (1) hour as needed (pain and dyspnea).  morphine (ROXANOL) 100 mg/5 mL (20 mg/mL) concentrated solution Take 5 mg by mouth daily. give 30 minutes before wound care    LORazepam (INTENSOL) 2 mg/mL concentrated solution Take 0.5 mg by mouth every one (1) hour as needed for Agitation or Anxiety.     hyoscyamine (LEVSIN) 0.125 mg/mL solution Take 125 mcg by mouth every three (3) hours as needed (secretions). 1 drop every 3 hours    prochlorperazine (COMPAZINE) 25 mg suppository Insert 25 mg into rectum every twelve (12) hours as needed for Nausea.  acetaminophen (TYLENOL) 650 mg suppository Insert 650 mg into rectum every four (4) hours as needed for Fever.  bisacodyl (DULCOLAX) 10 mg suppository Insert 10 mg into rectum daily as needed (constipation).  metroNIDAZOLE (FLAGYL) 500 mg tablet Take 500 mg by mouth daily. CRUSH AND APPLY TO SACRAL WOUND    0.9 % sodium chloride (SALINE WOUND [address]) Apply 1 Applicatorful to affected area daily.  brimonidine (ALPHAGAN P) 0.1 % ophthalmic solution Administer 1 Drop to left eye three (3) times daily. No current facility-administered medications for this visit. LAB DATA REVIEWED:     Lab Results   Component Value Date/Time    WBC 13.1 (H) 04/16/2019 08:48 AM    HGB 12.1 04/16/2019 08:48 AM    PLATELET 243 53/26/3192 08:48 AM     Lab Results   Component Value Date/Time    Sodium 142 04/16/2019 08:48 AM    Potassium 3.1 (L) 04/16/2019 08:48 AM    Chloride 108 04/16/2019 08:48 AM    CO2 27 04/16/2019 08:48 AM    BUN 16 04/16/2019 08:48 AM    Creatinine 0.78 04/16/2019 08:48 AM    Calcium 10.2 (H) 04/16/2019 08:48 AM    Magnesium 2.0 02/07/2015 10:44 PM    Phosphorus 2.9 02/07/2015 10:44 PM      Lab Results   Component Value Date/Time    AST (SGOT) 14 (L) 04/16/2019 08:48 AM    Alk.  phosphatase 120 (H) 04/16/2019 08:48 AM    Protein, total 7.1 04/16/2019 08:48 AM    Albumin 3.1 (L) 04/16/2019 08:48 AM    Globulin 4.0 04/16/2019 08:48 AM     Lab Results   Component Value Date/Time    INR 1.1 08/26/2015 10:16 PM    Prothrombin time 10.6 08/26/2015 10:16 PM    aPTT 26.0 08/26/2015 10:16 PM      No results found for: IRON, FE, TIBC, IBCT, PSAT, FERR        CONTROLLED SUBSTANCES SAFETY ASSESSMENT (IF ON CONTROLLED SUBSTANCES):     Reviewed opioid safety handout:  [] Yes   [] No  24 hour opioid dose >150mg morphine equivalent/day:  [] Yes [] No  Benzodiazepines:  [] Yes   [] No  Sleep apnea:  [] Yes   [] No  Urine Toxicology Testing within last 6 months:  [] Yes   [] No  History of or new aberrant medication taking behaviors:  [] Yes   [] No  Has Narcan been prescribed [] Yes   [] No          Total time: 70 minutes  Counseling / coordination time: 30 minutes  > 50% counseling / coordination?:

## 2019-05-31 NOTE — PATIENT INSTRUCTIONS
Dear Ana Roberts and Terry Paez,    It was nice meeting you both and your mom today. Delirium-she seems to be moaning and we are not sure if she is in pain. Her being very hard of hearing is making it very difficult to communicate. But the fact that she has lost a lot of weight the last few days, declined significantly now being completely bedbound unable to communicate very well may all be an indication during end of life. We had a long conversation today about where she was a few months ago and how much she has declined lately and it is important to keep her comfortable from now on. Bedsore-her sacral pressure ulcer may never heal because of how weak she is. We just need good wound care at this time. Goals of care-we talked about how we think mom is towards end of life and it is important to keep her comfortable. I really think hospice is the best choice at this point. I have called  [address] [place] [address] and they will be in to see and initiate services later today. It was a pleasure taking care of your mother today. Please feel free to call our office should you have any questions.

## 2019-06-03 VITALS
RESPIRATION RATE: 18 BRPM | OXYGEN SATURATION: 96 % | TEMPERATURE: 98.3 F | DIASTOLIC BLOOD PRESSURE: 77 MMHG | SYSTOLIC BLOOD PRESSURE: 99 MMHG | HEART RATE: 92 BPM

## 2019-06-03 PROCEDURE — 3331090002 HH PPS REVENUE DEBIT

## 2019-06-03 PROCEDURE — 3331090001 HH PPS REVENUE CREDIT

## 2019-06-04 ENCOUNTER — HOME CARE VISIT (OUTPATIENT)
Dept: HOSPICE | Facility: HOSPICE | Age: 84
End: 2019-06-04
Payer: MEDICARE

## 2019-06-04 PROCEDURE — 3331090002 HH PPS REVENUE DEBIT

## 2019-06-04 PROCEDURE — 3331090001 HH PPS REVENUE CREDIT

## 2019-06-05 PROCEDURE — 3331090002 HH PPS REVENUE DEBIT

## 2019-06-05 PROCEDURE — 3331090001 HH PPS REVENUE CREDIT

## 2019-06-06 PROCEDURE — 3331090002 HH PPS REVENUE DEBIT

## 2019-06-06 PROCEDURE — 3331090001 HH PPS REVENUE CREDIT

## 2019-06-07 PROCEDURE — 3331090001 HH PPS REVENUE CREDIT

## 2019-06-07 PROCEDURE — 3331090002 HH PPS REVENUE DEBIT

## 2019-06-08 PROCEDURE — 3331090001 HH PPS REVENUE CREDIT

## 2019-06-08 PROCEDURE — 3331090002 HH PPS REVENUE DEBIT

## 2019-06-09 PROCEDURE — 3331090001 HH PPS REVENUE CREDIT

## 2019-06-09 PROCEDURE — 3331090002 HH PPS REVENUE DEBIT

## 2019-06-10 ENCOUNTER — HOME CARE VISIT (OUTPATIENT)
Dept: HOSPICE | Facility: HOSPICE | Age: 84
End: 2019-06-10
Payer: MEDICARE

## 2019-06-10 PROCEDURE — 3331090002 HH PPS REVENUE DEBIT

## 2019-06-10 PROCEDURE — 3331090001 HH PPS REVENUE CREDIT

## 2019-06-11 PROCEDURE — 3331090002 HH PPS REVENUE DEBIT

## 2019-06-11 PROCEDURE — 3331090001 HH PPS REVENUE CREDIT

## 2019-06-12 PROCEDURE — 3331090002 HH PPS REVENUE DEBIT

## 2019-06-12 PROCEDURE — 3331090001 HH PPS REVENUE CREDIT

## 2019-06-13 PROCEDURE — 3331090002 HH PPS REVENUE DEBIT

## 2019-06-13 PROCEDURE — 3331090001 HH PPS REVENUE CREDIT

## 2019-07-10 NOTE — TELEPHONE ENCOUNTER
----- Message from Keshawn Drake MD sent at 11/14/2018  8:18 PM EST -----  Macrobid 100 mg BID for 7 days.
My Chart message sent to daughter Amanda Elizondo who is proxy and on hipaa. Explained that pt has UTI. Ucx grew >100,000 ecoli. Per [facility]J, will treat with Macrobid 100 BID x 7 days. Rx sent to pharm. Orders Placed This Encounter    nitrofurantoin, macrocrystal-monohydrate, (MACROBID) 100 mg capsule     Sig: Take 1 Cap by mouth two (2) times a day for 7 days. Dispense:  14 Cap     Refill:  0     The above orders were approved via [facility] per Dr. Cary Davis, III.
pregnant

## 2020-02-24 NOTE — TELEPHONE ENCOUNTER
Patient states he is still experiencing leg and joint pains since decreasing his simvastatin to 20mg every other day like you advise him to do for 30 days.  He was taking 40mg daily before this change.  SInce patient is still  experiencing his joint/arthritis pains he is asking for your recommendation to a Rheumatologist  He would like to see a specialist that you would recommend to him.   Spoke with pt daughter Geetha Quiroz who is on Hipaa. 2 pt identifiers. She states that mother has another UTI. She is having hallucinations, urine retention, and crying out. These are the \"classic signs\" when pt has had UTI's in past. Per SRJ, will start Bactrim DS BID x 7 days given pt age and hx. To repeat ua/cx when pt is finished abx. Linda verbalized complete understanding. Orders Placed This Encounter    trimethoprim-sulfamethoxazole (BACTRIM DS, SEPTRA DS) 160-800 mg per tablet     Sig: Take 1 Tab by mouth two (2) times a day for 7 days. Dispense:  14 Tab     Refill:  0     The above medication refills were approved via verbal order by Dr. José Miguel Lynne III.